# Patient Record
Sex: FEMALE | Race: WHITE | NOT HISPANIC OR LATINO | Employment: OTHER | URBAN - METROPOLITAN AREA
[De-identification: names, ages, dates, MRNs, and addresses within clinical notes are randomized per-mention and may not be internally consistent; named-entity substitution may affect disease eponyms.]

---

## 2019-02-24 ENCOUNTER — HOSPITAL ENCOUNTER (INPATIENT)
Facility: MEDICAL CENTER | Age: 34
LOS: 2 days | DRG: 156 | End: 2019-02-26
Attending: EMERGENCY MEDICINE | Admitting: INTERNAL MEDICINE
Payer: COMMERCIAL

## 2019-02-24 ENCOUNTER — APPOINTMENT (OUTPATIENT)
Dept: RADIOLOGY | Facility: MEDICAL CENTER | Age: 34
DRG: 156 | End: 2019-02-24
Attending: EMERGENCY MEDICINE
Payer: COMMERCIAL

## 2019-02-24 ENCOUNTER — HOSPITAL ENCOUNTER (OUTPATIENT)
Dept: RADIOLOGY | Facility: MEDICAL CENTER | Age: 34
End: 2019-02-24

## 2019-02-24 DIAGNOSIS — R06.1 STRIDOR: ICD-10-CM

## 2019-02-24 DIAGNOSIS — J06.9 UPPER RESPIRATORY TRACT INFECTION, UNSPECIFIED TYPE: ICD-10-CM

## 2019-02-24 DIAGNOSIS — R06.02 SOB (SHORTNESS OF BREATH): ICD-10-CM

## 2019-02-24 PROBLEM — E87.6 HYPOKALEMIA: Status: ACTIVE | Noted: 2019-02-24

## 2019-02-24 LAB
ALBUMIN SERPL BCP-MCNC: 4.4 G/DL (ref 3.2–4.9)
ALBUMIN/GLOB SERPL: 1.7 G/DL
ALP SERPL-CCNC: 65 U/L (ref 30–99)
ALT SERPL-CCNC: 13 U/L (ref 2–50)
ANION GAP SERPL CALC-SCNC: 6 MMOL/L (ref 0–11.9)
AST SERPL-CCNC: 15 U/L (ref 12–45)
BASOPHILS # BLD AUTO: 0 % (ref 0–1.8)
BASOPHILS # BLD: 0 K/UL (ref 0–0.12)
BILIRUB SERPL-MCNC: 0.3 MG/DL (ref 0.1–1.5)
BUN SERPL-MCNC: 15 MG/DL (ref 8–22)
CALCIUM SERPL-MCNC: 9.5 MG/DL (ref 8.5–10.5)
CHLORIDE SERPL-SCNC: 107 MMOL/L (ref 96–112)
CO2 SERPL-SCNC: 21 MMOL/L (ref 20–33)
CREAT SERPL-MCNC: 0.75 MG/DL (ref 0.5–1.4)
EOSINOPHIL # BLD AUTO: 0 K/UL (ref 0–0.51)
EOSINOPHIL NFR BLD: 0 % (ref 0–6.9)
ERYTHROCYTE [DISTWIDTH] IN BLOOD BY AUTOMATED COUNT: 44.8 FL (ref 35.9–50)
GLOBULIN SER CALC-MCNC: 2.6 G/DL (ref 1.9–3.5)
GLUCOSE SERPL-MCNC: 181 MG/DL (ref 65–99)
HCT VFR BLD AUTO: 40 % (ref 37–47)
HGB BLD-MCNC: 13.3 G/DL (ref 12–16)
IMM GRANULOCYTES # BLD AUTO: 0.02 K/UL (ref 0–0.11)
IMM GRANULOCYTES NFR BLD AUTO: 0.3 % (ref 0–0.9)
LACTATE BLD-SCNC: 2.3 MMOL/L (ref 0.5–2)
LYMPHOCYTES # BLD AUTO: 0.46 K/UL (ref 1–4.8)
LYMPHOCYTES NFR BLD: 5.9 % (ref 22–41)
MCH RBC QN AUTO: 30.1 PG (ref 27–33)
MCHC RBC AUTO-ENTMCNC: 33.3 G/DL (ref 33.6–35)
MCV RBC AUTO: 90.5 FL (ref 81.4–97.8)
MONOCYTES # BLD AUTO: 0.09 K/UL (ref 0–0.85)
MONOCYTES NFR BLD AUTO: 1.2 % (ref 0–13.4)
NEUTROPHILS # BLD AUTO: 7.2 K/UL (ref 2–7.15)
NEUTROPHILS NFR BLD: 92.6 % (ref 44–72)
NRBC # BLD AUTO: 0 K/UL
NRBC BLD-RTO: 0 /100 WBC
PLATELET # BLD AUTO: 265 K/UL (ref 164–446)
PMV BLD AUTO: 10.7 FL (ref 9–12.9)
POTASSIUM SERPL-SCNC: 3.4 MMOL/L (ref 3.6–5.5)
PROT SERPL-MCNC: 7 G/DL (ref 6–8.2)
RBC # BLD AUTO: 4.42 M/UL (ref 4.2–5.4)
SODIUM SERPL-SCNC: 134 MMOL/L (ref 135–145)
WBC # BLD AUTO: 7.8 K/UL (ref 4.8–10.8)

## 2019-02-24 PROCEDURE — 0CJS8ZZ INSPECTION OF LARYNX, VIA NATURAL OR ARTIFICIAL OPENING ENDOSCOPIC: ICD-10-PCS | Performed by: OTOLARYNGOLOGY

## 2019-02-24 PROCEDURE — 85025 COMPLETE CBC W/AUTO DIFF WBC: CPT

## 2019-02-24 PROCEDURE — 99291 CRITICAL CARE FIRST HOUR: CPT

## 2019-02-24 PROCEDURE — 700111 HCHG RX REV CODE 636 W/ 250 OVERRIDE (IP): Performed by: EMERGENCY MEDICINE

## 2019-02-24 PROCEDURE — 700102 HCHG RX REV CODE 250 W/ 637 OVERRIDE(OP)

## 2019-02-24 PROCEDURE — 83605 ASSAY OF LACTIC ACID: CPT

## 2019-02-24 PROCEDURE — 770022 HCHG ROOM/CARE - ICU (200)

## 2019-02-24 PROCEDURE — 80053 COMPREHEN METABOLIC PANEL: CPT

## 2019-02-24 PROCEDURE — 700111 HCHG RX REV CODE 636 W/ 250 OVERRIDE (IP): Performed by: INTERNAL MEDICINE

## 2019-02-24 PROCEDURE — 71045 X-RAY EXAM CHEST 1 VIEW: CPT

## 2019-02-24 PROCEDURE — 99291 CRITICAL CARE FIRST HOUR: CPT | Performed by: INTERNAL MEDICINE

## 2019-02-24 PROCEDURE — 94640 AIRWAY INHALATION TREATMENT: CPT

## 2019-02-24 PROCEDURE — 96365 THER/PROPH/DIAG IV INF INIT: CPT

## 2019-02-24 PROCEDURE — 700102 HCHG RX REV CODE 250 W/ 637 OVERRIDE(OP): Performed by: INTERNAL MEDICINE

## 2019-02-24 PROCEDURE — 94760 N-INVAS EAR/PLS OXIMETRY 1: CPT

## 2019-02-24 PROCEDURE — 700105 HCHG RX REV CODE 258: Performed by: EMERGENCY MEDICINE

## 2019-02-24 RX ORDER — BISACODYL 10 MG
10 SUPPOSITORY, RECTAL RECTAL
Status: DISCONTINUED | OUTPATIENT
Start: 2019-02-24 | End: 2019-02-26 | Stop reason: HOSPADM

## 2019-02-24 RX ORDER — PREDNISONE 10 MG/1
10 TABLET ORAL DAILY
Status: DISCONTINUED | OUTPATIENT
Start: 2019-03-11 | End: 2019-02-26 | Stop reason: HOSPADM

## 2019-02-24 RX ORDER — PREDNISONE 20 MG/1
20 TABLET ORAL DAILY
Status: DISCONTINUED | OUTPATIENT
Start: 2019-03-04 | End: 2019-02-24

## 2019-02-24 RX ORDER — BENZONATATE 100 MG/1
100 CAPSULE ORAL 3 TIMES DAILY PRN
COMMUNITY

## 2019-02-24 RX ORDER — AMOXICILLIN 250 MG
2 CAPSULE ORAL 2 TIMES DAILY
Status: DISCONTINUED | OUTPATIENT
Start: 2019-02-24 | End: 2019-02-26 | Stop reason: HOSPADM

## 2019-02-24 RX ORDER — PREDNISONE 10 MG/1
10 TABLET ORAL DAILY
Status: DISCONTINUED | OUTPATIENT
Start: 2019-03-11 | End: 2019-02-24

## 2019-02-24 RX ORDER — METOCLOPRAMIDE 10 MG/1
10 TABLET ORAL 4 TIMES DAILY
COMMUNITY
End: 2019-02-24

## 2019-02-24 RX ORDER — METOCLOPRAMIDE 10 MG/1
10 TABLET ORAL 4 TIMES DAILY
COMMUNITY

## 2019-02-24 RX ORDER — PREDNISONE 20 MG/1
40 TABLET ORAL DAILY
Status: DISCONTINUED | OUTPATIENT
Start: 2019-02-25 | End: 2019-02-24

## 2019-02-24 RX ORDER — PREDNISONE 20 MG/1
40 TABLET ORAL DAILY
Status: DISCONTINUED | OUTPATIENT
Start: 2019-02-25 | End: 2019-02-26 | Stop reason: HOSPADM

## 2019-02-24 RX ORDER — ENALAPRILAT 1.25 MG/ML
1.25 INJECTION INTRAVENOUS EVERY 6 HOURS PRN
Status: DISCONTINUED | OUTPATIENT
Start: 2019-02-24 | End: 2019-02-26 | Stop reason: HOSPADM

## 2019-02-24 RX ORDER — DEXAMETHASONE SODIUM PHOSPHATE 4 MG/ML
10 INJECTION, SOLUTION INTRA-ARTICULAR; INTRALESIONAL; INTRAMUSCULAR; INTRAVENOUS; SOFT TISSUE EVERY 6 HOURS
Status: COMPLETED | OUTPATIENT
Start: 2019-02-24 | End: 2019-02-25

## 2019-02-24 RX ORDER — GUAIFENESIN/DEXTROMETHORPHAN 100-10MG/5
10 SYRUP ORAL EVERY 6 HOURS PRN
Status: DISCONTINUED | OUTPATIENT
Start: 2019-02-24 | End: 2019-02-26 | Stop reason: HOSPADM

## 2019-02-24 RX ORDER — POLYETHYLENE GLYCOL 3350 17 G/17G
1 POWDER, FOR SOLUTION ORAL
Status: DISCONTINUED | OUTPATIENT
Start: 2019-02-24 | End: 2019-02-26 | Stop reason: HOSPADM

## 2019-02-24 RX ORDER — ACETAMINOPHEN 325 MG/1
650 TABLET ORAL EVERY 6 HOURS PRN
Status: DISCONTINUED | OUTPATIENT
Start: 2019-02-24 | End: 2019-02-26 | Stop reason: HOSPADM

## 2019-02-24 RX ORDER — PREDNISONE 20 MG/1
20 TABLET ORAL DAILY
Status: DISCONTINUED | OUTPATIENT
Start: 2019-03-04 | End: 2019-02-26 | Stop reason: HOSPADM

## 2019-02-24 RX ADMIN — CEFTRIAXONE SODIUM 2 G: 2 INJECTION, POWDER, FOR SOLUTION INTRAMUSCULAR; INTRAVENOUS at 16:43

## 2019-02-24 RX ADMIN — DEXAMETHASONE SODIUM PHOSPHATE 10 MG: 4 INJECTION, SOLUTION INTRAMUSCULAR; INTRAVENOUS at 20:03

## 2019-02-24 RX ADMIN — RACEPINEPHRINE HYDROCHLORIDE 0.5 ML: 11.25 SOLUTION RESPIRATORY (INHALATION) at 16:04

## 2019-02-24 RX ADMIN — GUAIFENESIN AND DEXTROMETHORPHAN 10 ML: 100; 10 SYRUP ORAL at 20:03

## 2019-02-24 ASSESSMENT — ENCOUNTER SYMPTOMS
PALPITATIONS: 0
DIAPHORESIS: 0
TREMORS: 0
PND: 0
WEAKNESS: 0
NECK PAIN: 0
HEMOPTYSIS: 0
CLAUDICATION: 0
WHEEZING: 1
SPEECH CHANGE: 0
CHILLS: 0
WEIGHT LOSS: 0
ABDOMINAL PAIN: 0
DOUBLE VISION: 0
FEVER: 0
VOMITING: 0
SEIZURES: 0
HEADACHES: 1
PHOTOPHOBIA: 0
BLURRED VISION: 0
MYALGIAS: 0
EYE PAIN: 0
COUGH: 1
HEADACHES: 0
MYALGIAS: 1
CONSTIPATION: 0
NAUSEA: 0
SHORTNESS OF BREATH: 1
FOCAL WEAKNESS: 0
BACK PAIN: 0
BRUISES/BLEEDS EASILY: 0
STRIDOR: 1
DIZZINESS: 0
SORE THROAT: 0
HALLUCINATIONS: 0

## 2019-02-24 ASSESSMENT — PATIENT HEALTH QUESTIONNAIRE - PHQ9
7. TROUBLE CONCENTRATING ON THINGS, SUCH AS READING THE NEWSPAPER OR WATCHING TELEVISION: NOT AT ALL
SUM OF ALL RESPONSES TO PHQ QUESTIONS 1-9: 2
2. FEELING DOWN, DEPRESSED, IRRITABLE, OR HOPELESS: SEVERAL DAYS
6. FEELING BAD ABOUT YOURSELF - OR THAT YOU ARE A FAILURE OR HAVE LET YOURSELF OR YOUR FAMILY DOWN: NOT AL ALL
1. LITTLE INTEREST OR PLEASURE IN DOING THINGS: SEVERAL DAYS
8. MOVING OR SPEAKING SO SLOWLY THAT OTHER PEOPLE COULD HAVE NOTICED. OR THE OPPOSITE, BEING SO FIGETY OR RESTLESS THAT YOU HAVE BEEN MOVING AROUND A LOT MORE THAN USUAL: NOT AT ALL
9. THOUGHTS THAT YOU WOULD BE BETTER OFF DEAD, OR OF HURTING YOURSELF: NOT AT ALL
5. POOR APPETITE OR OVEREATING: NOT AT ALL
3. TROUBLE FALLING OR STAYING ASLEEP OR SLEEPING TOO MUCH: NOT AT ALL
SUM OF ALL RESPONSES TO PHQ9 QUESTIONS 1 AND 2: 2
4. FEELING TIRED OR HAVING LITTLE ENERGY: NOT AT ALL

## 2019-02-24 ASSESSMENT — COGNITIVE AND FUNCTIONAL STATUS - GENERAL
MOBILITY SCORE: 23
SUGGESTED CMS G CODE MODIFIER DAILY ACTIVITY: CH
CLIMB 3 TO 5 STEPS WITH RAILING: A LITTLE
SUGGESTED CMS G CODE MODIFIER MOBILITY: CI
DAILY ACTIVITIY SCORE: 24

## 2019-02-24 ASSESSMENT — COPD QUESTIONNAIRES
DURING THE PAST 4 WEEKS HOW MUCH DID YOU FEEL SHORT OF BREATH: NONE/LITTLE OF THE TIME
COPD SCREENING SCORE: 0
HAVE YOU SMOKED AT LEAST 100 CIGARETTES IN YOUR ENTIRE LIFE: NO/DON'T KNOW
DO YOU EVER COUGH UP ANY MUCUS OR PHLEGM?: NO/ONLY WITH OCCASIONAL COLDS OR INFECTIONS

## 2019-02-24 ASSESSMENT — LIFESTYLE VARIABLES
SUBSTANCE_ABUSE: 0
DO YOU DRINK ALCOHOL: NO
EVER_SMOKED: NEVER
EVER_SMOKED: NEVER

## 2019-02-24 NOTE — LETTER
February 26, 2019    To Whom It May Concern:         This is confirmation that Chantale Donato was admitted to [facility] from 2/24/2019-2/26/2019. Her medical conditions prohibit her from resuming her work until 5/1/2019.         If you have any questions please do not hesitate to call me at the phone number listed below.    Sincerely,          John Jefferson

## 2019-02-25 PROBLEM — J38.01 UNILATERAL VOCAL CORD PARALYSIS: Status: ACTIVE | Noted: 2019-02-25

## 2019-02-25 LAB
ANION GAP SERPL CALC-SCNC: 5 MMOL/L (ref 0–11.9)
BASOPHILS # BLD AUTO: 0.1 % (ref 0–1.8)
BASOPHILS # BLD: 0.01 K/UL (ref 0–0.12)
BUN SERPL-MCNC: 17 MG/DL (ref 8–22)
CALCIUM SERPL-MCNC: 9.5 MG/DL (ref 8.5–10.5)
CHLORIDE SERPL-SCNC: 110 MMOL/L (ref 96–112)
CO2 SERPL-SCNC: 21 MMOL/L (ref 20–33)
CREAT SERPL-MCNC: 0.56 MG/DL (ref 0.5–1.4)
EOSINOPHIL # BLD AUTO: 0.01 K/UL (ref 0–0.51)
EOSINOPHIL NFR BLD: 0.1 % (ref 0–6.9)
ERYTHROCYTE [DISTWIDTH] IN BLOOD BY AUTOMATED COUNT: 45.9 FL (ref 35.9–50)
GLUCOSE SERPL-MCNC: 132 MG/DL (ref 65–99)
HCT VFR BLD AUTO: 38.9 % (ref 37–47)
HGB BLD-MCNC: 13 G/DL (ref 12–16)
IMM GRANULOCYTES # BLD AUTO: 0.05 K/UL (ref 0–0.11)
IMM GRANULOCYTES NFR BLD AUTO: 0.5 % (ref 0–0.9)
LYMPHOCYTES # BLD AUTO: 0.72 K/UL (ref 1–4.8)
LYMPHOCYTES NFR BLD: 7.1 % (ref 22–41)
MCH RBC QN AUTO: 30 PG (ref 27–33)
MCHC RBC AUTO-ENTMCNC: 33.4 G/DL (ref 33.6–35)
MCV RBC AUTO: 89.8 FL (ref 81.4–97.8)
MONOCYTES # BLD AUTO: 0.18 K/UL (ref 0–0.85)
MONOCYTES NFR BLD AUTO: 1.8 % (ref 0–13.4)
NEUTROPHILS # BLD AUTO: 9.15 K/UL (ref 2–7.15)
NEUTROPHILS NFR BLD: 90.4 % (ref 44–72)
NRBC # BLD AUTO: 0 K/UL
NRBC BLD-RTO: 0 /100 WBC
PLATELET # BLD AUTO: 247 K/UL (ref 164–446)
PMV BLD AUTO: 10.8 FL (ref 9–12.9)
POTASSIUM SERPL-SCNC: 3.9 MMOL/L (ref 3.6–5.5)
RBC # BLD AUTO: 4.33 M/UL (ref 4.2–5.4)
SODIUM SERPL-SCNC: 136 MMOL/L (ref 135–145)
WBC # BLD AUTO: 10.1 K/UL (ref 4.8–10.8)

## 2019-02-25 PROCEDURE — 99233 SBSQ HOSP IP/OBS HIGH 50: CPT | Performed by: INTERNAL MEDICINE

## 2019-02-25 PROCEDURE — 700102 HCHG RX REV CODE 250 W/ 637 OVERRIDE(OP): Performed by: INTERNAL MEDICINE

## 2019-02-25 PROCEDURE — 770001 HCHG ROOM/CARE - MED/SURG/GYN PRIV*

## 2019-02-25 PROCEDURE — 80048 BASIC METABOLIC PNL TOTAL CA: CPT

## 2019-02-25 PROCEDURE — A9270 NON-COVERED ITEM OR SERVICE: HCPCS | Performed by: INTERNAL MEDICINE

## 2019-02-25 PROCEDURE — 700111 HCHG RX REV CODE 636 W/ 250 OVERRIDE (IP): Performed by: INTERNAL MEDICINE

## 2019-02-25 PROCEDURE — 85025 COMPLETE CBC W/AUTO DIFF WBC: CPT

## 2019-02-25 RX ORDER — FAMOTIDINE 20 MG/1
20 TABLET, FILM COATED ORAL 2 TIMES DAILY
Status: DISCONTINUED | OUTPATIENT
Start: 2019-02-25 | End: 2019-02-26 | Stop reason: HOSPADM

## 2019-02-25 RX ADMIN — DEXAMETHASONE SODIUM PHOSPHATE 10 MG: 4 INJECTION, SOLUTION INTRAMUSCULAR; INTRAVENOUS at 05:32

## 2019-02-25 RX ADMIN — PREDNISONE 40 MG: 20 TABLET ORAL at 12:03

## 2019-02-25 RX ADMIN — FAMOTIDINE 20 MG: 20 TABLET ORAL at 17:39

## 2019-02-25 RX ADMIN — GUAIFENESIN AND DEXTROMETHORPHAN 10 ML: 100; 10 SYRUP ORAL at 05:32

## 2019-02-25 RX ADMIN — ENOXAPARIN SODIUM 40 MG: 100 INJECTION SUBCUTANEOUS at 05:32

## 2019-02-25 RX ADMIN — FAMOTIDINE 20 MG: 20 TABLET ORAL at 08:11

## 2019-02-25 RX ADMIN — DEXAMETHASONE SODIUM PHOSPHATE 10 MG: 4 INJECTION, SOLUTION INTRAMUSCULAR; INTRAVENOUS at 00:46

## 2019-02-25 RX ADMIN — GUAIFENESIN AND DEXTROMETHORPHAN 10 ML: 100; 10 SYRUP ORAL at 17:42

## 2019-02-25 ASSESSMENT — ENCOUNTER SYMPTOMS
EYE REDNESS: 0
PALPITATIONS: 0
NAUSEA: 0
SINUS PAIN: 0
ABDOMINAL PAIN: 0
HEADACHES: 1
HEARTBURN: 0
FEVER: 0
SORE THROAT: 1
EYE DISCHARGE: 0
CHILLS: 0
SHORTNESS OF BREATH: 1
PSYCHIATRIC NEGATIVE: 1
WHEEZING: 1
VOMITING: 0
STRIDOR: 1
MYALGIAS: 1
COUGH: 1
DIARRHEA: 0

## 2019-02-25 ASSESSMENT — COPD QUESTIONNAIRES
DURING THE PAST 4 WEEKS HOW MUCH DID YOU FEEL SHORT OF BREATH: NONE/LITTLE OF THE TIME
HAVE YOU SMOKED AT LEAST 100 CIGARETTES IN YOUR ENTIRE LIFE: NO/DON'T KNOW
DO YOU EVER COUGH UP ANY MUCUS OR PHLEGM?: NO/ONLY WITH OCCASIONAL COLDS OR INFECTIONS
COPD SCREENING SCORE: 0

## 2019-02-25 ASSESSMENT — LIFESTYLE VARIABLES: EVER_SMOKED: NEVER

## 2019-02-25 NOTE — CONSULTS
PULMONARY AND CRITICAL CARE MEDICINE CONSULTATION    Date of Consultation:  2/24/2019    Requesting Physician:  Segundo Pal MD    Consulting Physician:  Orion Penaloza MD    Reason for Consultation: Critical care management in lady with stridor    Chief Complaint: Dyspnea and hoarseness    History of Present Illness:    I was kindly asked to see and evaluate Chantale Donato, a 33 y.o. female for evaluation and management of the above problem.    This is a previously healthy lady.  She presented to an outside hospital earlier today with a complaint of shortness of breath and wheezing.  She had stridor.  She was treated with steroids and nebulized epinephrine.  She continued to have significant dyspnea and was transferred to [facility] for subspecialty care.  Dr. Laura Ireland from ENT will be seeing her shortly.  This lady tells me that her symptoms began approximately 1 month ago.  At that time she had flulike symptoms and presented for evaluation.  She was diagnosed with influenza.  This diagnosis was made with a flu swab.  She was treated with Tamiflu and a Z-Ruiz.  Her fever, myalgias and arthralgias have subsequently resolved.  She developed hoarseness and problems with breathing with wheezing.  About a week after she was seen for influenza, she presented to her PCP and was given steroids and a second Z-Ruiz.  Despite these treatments, she does not feel like she has been improving.  She continues to have a feeling that her throat is constricted with associated hoarseness, wheezing and dyspnea.  She denies any fevers, chills or sweats.  She is not producing sputum.    Medications Prior to Admission:    No current facility-administered medications on file prior to encounter.      No current outpatient prescriptions on file prior to encounter.       Current Medications:      Current Facility-Administered Medications:   •  cefTRIAXone (ROCEPHIN) 2 g in  mL IVPB, 2 g, Intravenous, Once, Segundo SIBLEY  SHERWIN Pal, Last Rate: 200 mL/hr at 02/24/19 1643, 2 g at 02/24/19 1643  •  Respiratory Care per Protocol, , Nebulization, Continuous RT, Orion Penaloza M.D.  •  dexamethasone (DECADRON) injection 10 mg, 10 mg, Intravenous, Q6HRS, Orion Penaloza M.D.    Current Outpatient Prescriptions:   •  metoclopramide (REGLAN) 10 MG Tab, Take 10 mg by mouth 4 times a day., Disp: , Rfl:   •  benzonatate (TESSALON) 100 MG Cap, Take 100 mg by mouth 3 times a day as needed for Cough., Disp: , Rfl:     Allergies:    Cinnamon    Past Surgical History:    No past surgical history on file.    Past Medical History:    Past Medical History:   Diagnosis Date   • Epiglottitis        Social History:    Social History     Social History   • Marital status:      Spouse name: N/A   • Number of children: N/A   • Years of education: N/A     Occupational History   • Not on file.     Social History Main Topics   • Smoking status: Not on file   • Smokeless tobacco: Not on file   • Alcohol use Not on file   • Drug use: Unknown   • Sexual activity: Not on file     Other Topics Concern   • Not on file     Social History Narrative   • No narrative on file       Family History:    No family history on file.    Review of System:    Review of Systems   Constitutional: Negative for chills, diaphoresis and fever.   HENT: Positive for congestion. Negative for nosebleeds.    Eyes: Negative for blurred vision, double vision and photophobia.   Respiratory: Positive for cough, shortness of breath, wheezing and stridor. Negative for hemoptysis.    Cardiovascular: Negative for chest pain, claudication and leg swelling.   Gastrointestinal: Negative for abdominal pain, constipation, nausea and vomiting.   Genitourinary: Negative for dysuria, hematuria and urgency.   Musculoskeletal: Negative for back pain and myalgias.   Skin: Negative for rash.   Neurological: Negative for speech change, focal weakness, seizures and headaches.  "  Endo/Heme/Allergies: Does not bruise/bleed easily.   Psychiatric/Behavioral: Negative for hallucinations, substance abuse and suicidal ideas.       Physical Examination:    /87   Pulse 95   Temp 36.9 °C (98.4 °F) (Temporal)   Resp 14   Ht 1.753 m (5' 9\")   Wt 86.2 kg (190 lb)   SpO2 100%   BMI 28.06 kg/m²   Physical Exam   Constitutional: She is oriented to person, place, and time. She appears distressed.   HENT:   Head: Normocephalic and atraumatic.   Right Ear: External ear normal.   Left Ear: External ear normal.   Nose: Nose normal.   Mouth/Throat: Oropharynx is clear and moist.   Eyes: Pupils are equal, round, and reactive to light. Conjunctivae are normal. Right eye exhibits no discharge. Left eye exhibits no discharge.   Neck: Normal range of motion. Neck supple. No JVD present. No tracheal deviation present.   Cardiovascular: Intact distal pulses.  Exam reveals no gallop and no friction rub.    No murmur heard.  Sinus tachycardia   Pulmonary/Chest: Stridor present. She has no rales.   She has stridor   Abdominal: Soft. Bowel sounds are normal. She exhibits no distension. There is no tenderness. There is no rebound.   Musculoskeletal: She exhibits no tenderness or deformity.   No clubbing or cyanosis   Neurological: She is alert and oriented to person, place, and time. No cranial nerve deficit. Coordination normal. GCS score is 15.   No focal weakness   Skin: Skin is warm and dry. No rash noted. She is not diaphoretic. No erythema.       Laboratory Data:                              Imaging:    I personally viewed the CXR and CT scan images as well as reviewed the radiology interpretation reports.    DX-CHEST-PORTABLE (1 VIEW)   Final Result      No acute cardiac or pulmonary abnormalities are identified.      CT-FOREIGN FILM CAT SCAN   Final Result      OUTSIDE IMAGES-DX CHEST   Final Result          Assessment and Plan:    Stridor   Assessment & Plan    Start Decadron, 10 mg IV every 6 " hours  Nebulized racemic epinephrine as necessary  She may require intubation and mechanical ventilatory support  ENT evaluation for visual evaluation of her airway       I have assessed and reassessed her respiratory status.  She is critically ill and I am admitting her to the ICU for very close observation.  She is at high risk of developing worsening respiratory system dysfunction.    High risk of deterioration and worsening vital organ dysfunction and death without the above critical care interventions.    Thank you for allowing me to participate in the care of this lady.  I will continue to follow her with great interest.    Critical Care Time: 35 minutes  62[address]  No time overlap  Time excludes procedures  Discussed with RN, RT, ER physician, Dr. Ireland, [employer] resident    Orion Penaloza MD  Pulmonary and Critical Care Medicine

## 2019-02-25 NOTE — CONSULTS
DATE OF SERVICE:  02/24/2019    REFERRING PHYSICIAN:  Segundo Pal MD, Otolaryngology.    REASON FOR CONSULTATION:  Question of upper airway obstruction.    REVIEW OF MEDICAL RECORD:  The patient is a 33-year-old female who comes in   from [place].  The patient initially was transferred from [place].  She reported   cold, flu-like symptoms over the last 4 weeks, initially diagnosed as having   influenza and placed on 5 days of Tamiflu followed by a Z-CARMENCITA.  The patient   then started having some difficulty again a couple of weeks afterwards where   she was put on a second Z-CARMENCITA.  She presented today with stridor.  Patient had   had a CT of neck and a chest x-ray completed at the outside hospital.  On   admission here, the patient had received albuterol, racemic epinephrine and   Decadron.  She also had a prior history of epiglottitis 5 years ago.    In talking with the patient, she has difficulty with catching her breath and   expiration becoming a problem.  She is having some issues with her speech and   currently sitting up in bed and conversing with me.  During that period of   time when the patient is conversant, symptoms abated to baseline normal   breathing.  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    ALLERGIES:  TO CINNAMON.    MEDICATIONS:  Not listed.    PAST MEDICAL HISTORY:  Prior history of epiglottitis 5 years ago.    PAST SURGICAL HISTORY:  No discuss surgical history.    PHYSICAL EXAMINATION:  GENERAL:  This is a well-nourished, well-appearing 33-year-old female,   currently sitting in bed.  She is able to converse with me.  She is able   turned sideways at the bed without difficulty.  She currently is on room air.    Doing a rigid endoscopy with the 70-degree scope, we were able to have the    patient looked over into the larynx.  I can see into the immediate subglottis,   left vocal cord is moving completely, right vocal cord has trailing motion   and slight weakness.  Upon inspiration, the patient is able to hold the   glottis shut and then in observing this, after she relaxes, she is able to   breathe normally and the vocal cord is able to move out of the way.  At no   time did the patient have laryngospasm during the examination.  Posterior   arytenoids were clear.  Epiglottis was normal.  There is no evidence of any   supraglottic swelling, erythema and edema.  No evidence of any pus and the   false vocal cords are within normal limits as well.  Trachea is midline.  No   thyromegaly is appreciated.    LABORATORY DATA:  Laboratory data was performed and shows white count of 7.8,   a left shift of 92.6 on neutrophils, platelets are 265.  Laboratory data shows   sodium of 134, potassium 3.4, chloride of 107, and glucose of 181.  CT scan   outside was reviewed, normal epiglottis.  No evidence of any upper airway   obstruction seen.    IMPRESSION:  Slight right-sided weakness of the true vocal cords, most likely   secondary to URI.    PLAN:  I discussed with the patient that at this point there is possibility   that a higher dose of steroids may be helpful; however, she did have problems   with steroids in the past with irritability and mood disorder.  I did discuss   with her about vocal rest, no signing or lecturing, and certainly no   whispering either.  The patient herself does have some significant cough   difficulties, so we discussed about using different antitussive medications   including Tussionex or other controlled substance medications in the past, she   has used Tessalon and there are ineffective.  We will go ahead and review   this with the admitting service.       ____________________________________     MD ALAYNA PEDROZA / SHANTELL    DD:  02/24/2019 17:24:38  DT:  02/24/2019  18:51:36    D#:  [id number]  Job#:  [id number]

## 2019-02-25 NOTE — ED PROVIDER NOTES
ED Provider Note    CHIEF COMPLAINT  Chief Complaint   Patient presents with   • Shortness of Breath       HPI  Chantale Donato is a 33 y.o. female who presents to the emergency department for difficulty breathing.  She was recently diagnosed with the flu.  She was put on Z-Ruiz, Tamiflu and steroids.  She never really got better.  She was getting worse today so she went to the emergency department where she was worked up with a chest x-ray which was unremarkable CT of the neck which was unremarkable given multiple breathing treatments and steroids.  Despite that she continues to feel like she has a hard time breathing.  She has no chest pain.  No fevers or chills.  She feels there is a ball in her neck obstructing her exhalation.  He has a previous history of epiglottitis.  Denies any other aggravating leaving factors or associated complaints    REVIEW OF SYSTEMS  See HPI for further details. All other systems are negative.    PAST MEDICAL HISTORY  Past Medical History:   Diagnosis Date   • Epiglottitis        FAMILY HISTORY  No family history on file.    SOCIAL HISTORY  Social History     Social History   • Marital status:      Spouse name: N/A   • Number of children: N/A   • Years of education: N/A     Social History Main Topics   • Smoking status: Not on file   • Smokeless tobacco: Not on file   • Alcohol use Not on file   • Drug use: Unknown   • Sexual activity: Not on file     Other Topics Concern   • Not on file     Social History Narrative   • No narrative on file       SURGICAL HISTORY  No past surgical history on file.    CURRENT MEDICATIONS  Home Medications     Reviewed by Anselmo Askew (Pharmacy Tech) on 02/24/19 at 1630  Med List Status: Complete   Medication Last Dose Status   benzonatate (TESSALON) 100 MG Cap 2/24/2019 Active   metoclopramide (REGLAN) 10 MG Tab 2/24/2019 Active                ALLERGIES  Allergies   Allergen Reactions   • Cinnamon      Blisters on lips         PHYSICAL  "EXAM  VITAL SIGNS: /87   Pulse 95   Resp 14   Ht 1.753 m (5' 9\")   Wt 86.2 kg (190 lb)   SpO2 100%   BMI 28.06 kg/m²    Constitutional: Awake alert sitting for anxious in moderate distress   HENT: Normocephalic, Atraumatic, Bilateral external ears normal, Oropharynx moist, No oral exudates, Nose normal.   Eyes: PERRL, EOMI, Conjunctiva normal, No discharge.   Neck: Normal range of motion, No tenderness, Supple, some abnormal lung sounds may be stridor F.  To be very pronounced on expiration but not present on inspiration  Cardiovascular: Normal heart rate, Normal rhythm, No murmurs, No rubs, No gallops.   Thorax & Lungs: Normal breath sounds, No respiratory distress, No wheezing,  Abdomen: Bowel sounds normal, Soft, No tenderness,  Skin: Warm, Dry, No erythema, No rash.   Back: No tenderness, No CVA tenderness.      Musculoskeletal: Good range of motion in all major joints.   Neurologic: Alert,  No focal deficits noted.   Psychiatric: Affect normal    Results for orders placed or performed during the hospital encounter of 02/24/19   CBC WITH DIFFERENTIAL   Result Value Ref Range    WBC 7.8 4.8 - 10.8 K/uL    RBC 4.42 4.20 - 5.40 M/uL    Hemoglobin 13.3 12.0 - 16.0 g/dL    Hematocrit 40.0 37.0 - 47.0 %    MCV 90.5 81.4 - 97.8 fL    MCH 30.1 27.0 - 33.0 pg    MCHC 33.3 (L) 33.6 - 35.0 g/dL    RDW 44.8 35.9 - 50.0 fL    Platelet Count 265 164 - 446 K/uL    MPV 10.7 9.0 - 12.9 fL    Neutrophils-Polys 92.60 (H) 44.00 - 72.00 %    Lymphocytes 5.90 (L) 22.00 - 41.00 %    Monocytes 1.20 0.00 - 13.40 %    Eosinophils 0.00 0.00 - 6.90 %    Basophils 0.00 0.00 - 1.80 %    Immature Granulocytes 0.30 0.00 - 0.90 %    Nucleated RBC 0.00 /100 WBC    Neutrophils (Absolute) 7.20 (H) 2.00 - 7.15 K/uL    Lymphs (Absolute) 0.46 (L) 1.00 - 4.80 K/uL    Monos (Absolute) 0.09 0.00 - 0.85 K/uL    Eos (Absolute) 0.00 0.00 - 0.51 K/uL    Baso (Absolute) 0.00 0.00 - 0.12 K/uL    Immature Granulocytes (abs) 0.02 0.00 - 0.11 K/uL    " NRBC (Absolute) 0.00 K/uL   COMP METABOLIC PANEL   Result Value Ref Range    Sodium 134 (L) 135 - 145 mmol/L    Potassium 3.4 (L) 3.6 - 5.5 mmol/L    Chloride 107 96 - 112 mmol/L    Co2 21 20 - 33 mmol/L    Anion Gap 6.0 0.0 - 11.9    Glucose 181 (H) 65 - 99 mg/dL    Bun 15 8 - 22 mg/dL    Creatinine 0.75 0.50 - 1.40 mg/dL    Calcium 9.5 8.5 - 10.5 mg/dL    AST(SGOT) 15 12 - 45 U/L    ALT(SGPT) 13 2 - 50 U/L    Alkaline Phosphatase 65 30 - 99 U/L    Total Bilirubin 0.3 0.1 - 1.5 mg/dL    Albumin 4.4 3.2 - 4.9 g/dL    Total Protein 7.0 6.0 - 8.2 g/dL    Globulin 2.6 1.9 - 3.5 g/dL    A-G Ratio 1.7 g/dL   LACTIC ACID   Result Value Ref Range    Lactic Acid 2.3 (H) 0.5 - 2.0 mmol/L   ESTIMATED GFR   Result Value Ref Range    GFR If African American >60 >60 mL/min/1.73 m 2    GFR If Non African American >60 >60 mL/min/1.73 m 2        RADIOLOGY/PROCEDURES  DX-CHEST-PORTABLE (1 VIEW)   Final Result      No acute cardiac or pulmonary abnormalities are identified.      CT-FOREIGN FILM CAT SCAN   Final Result      OUTSIDE IMAGES-DX CHEST   Final Result            COURSE & MEDICAL DECISION MAKING  Pertinent Labs & Imaging studies reviewed. (See chart for details)        The patient presents transfer for difficulty breathing after several days of upper airway infection.  She was worked up with an x-ray and a CAT scan at the transferring hospital.  These were both read as normal.    I have independently reviewed the chest x-ray which does show clear lungs.  The neck CT looks grossly normal to me however the sequence of films is a bit unusual which conceivably could make it easy to miss an abnormal finding.    I spoke with Dr. Barron on call for ICU he came and saw the patient.  Additionally, I called ENT Dr. Ireland who came and performed a scope on the patient.    Dr. Ireland felt there was a little bit of vocal cord weakness on the right.  Did not see any other significant infection.  Do not recommend intubation.  Thought  some of this was made worse by her own actions.  He recommends admission and observation per      Labs were obtained and the results of been reviewed.  The patient currently does not have influenza.  She was empirically given Rocephin when I first saw her before we knew this was not an infectious process.  The patient will be admitted to the ICU for airway monitoring.  She is admitted in guarded condition.        FINAL IMPRESSION  1. SOB (shortness of breath)    2. Stridor    3. Upper respiratory tract infection, unspecified type    4.  Critical care time of 45 minutes.    2.   3.         Electronically signed by: Segundo Pal, 2/24/2019 4:27 PM

## 2019-02-25 NOTE — OP REPORT
DATE OF SERVICE:  02/24/2019    PREOPERATIVE DIAGNOSIS:  Question of upper airway obstruction.    POSTPROCEDURE DIAGNOSIS:  Slight right-sided vocal cord weakness exacerbated   by patient breathing on inspiration.    PROCEDURE:  A 70-degree Ji ever laryngoscopy exam.    SURGEON:  Laura Ireland MD    NARRATIVE:  I met the patient in a red 9.  She had had a history of upper   airway obstruction that they would like evaluated.  So at the bedside, we did   a 70-degree Ji ever evaluation.  While gently holding her tongue, we were   able to look over the top and into the oral cavity, oropharynx, and down into   the larynx, epiglottis was normal, aryepiglottic folds were normal.  Posterior   arytenoids were normal.  False cords are normal and the true vocal cords   showed no evidence of any erythema or infection, but the right-sided cord was   slightly weakened and on vigorous inspiration partially collapsing the airway   causing the obstructive symptoms.  When the patient was in a ____ manner, the   right vocal cord was able to release and move out of the way.  The subglottis   was immediately visualized.  No evidence of any obstruction or lesions.    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  See my note for recommendations.       ____________________________________     MD ALAYNA PEDROZA / NTS    DD:  02/24/2019 17:26:30  DT:  02/24/2019 17:53:13    D#:  [id number]  Job#:  [id number]

## 2019-02-25 NOTE — CARE PLAN
Problem: Communication  Goal: The ability to communicate needs accurately and effectively will improve    Intervention: [place] patient and significant other/support system to call light to alert staff of needs  Oriented patient to unit.

## 2019-02-25 NOTE — DISCHARGE PLANNING
Care Transition Team Assessment    Met with pt at bedside. According to pt she lives with her spouse and 5 month old baby.    Pt reports she is independent at baseline and does not use assistive devices. Pt said her 5 month old baby is being cared for by father.    No obvious needs identified at this time. Pt confirmed her  will provide transport at discharge.    Information Source  Orientation : Oriented x 4  Information Given By: Patient    Readmission Evaluation  Is this a readmission?: No    Interdisciplinary Discharge Planning  Does Admitting Nurse Feel This Could be a Complex Discharge?: No  Primary Care Physician: Adeola Mejia  Lives with - Patient's Self Care Capacity: Spouse, Child Less than 18 Years of Age (5 month old baby)  Support Systems: Spouse / Significant Other  Do You Take your Prescribed Medications Regularly: Yes  Able to Return to Previous ADL's: Yes  Mobility Issues: No  Patient Expects to be Discharged to:: Home  Assistance Needed: No  Durable Medical Equipment: Not Applicable    Discharge Preparedness  What is your plan after discharge?:  (Anticipate discharge to prior living)  What are your discharge supports?: Spouse  Prior Functional Level: Ambulatory, Independent with Activities of Daily Living, Independent with Medication Management  Difficulity with ADLs: None  Difficulity with IADLs: None    Functional Assesment  Prior Functional Level: Ambulatory, Independent with Activities of Daily Living, Independent with Medication Management    Finances  Prescription Coverage: Yes    Discharge Risks or Barriers  Discharge risks or barriers?: No    Anticipated Discharge Information  Anticipated discharge disposition: Home  Discharge Address: [address] [place] [address] Emi Hunt  Discharge Contact Phone Number: [phone]

## 2019-02-25 NOTE — H&P
Internal Medicine Admitting History and Physical    Note Author: Sonia Shelley M.D.       Name Chantale Donato 1985   Age/Sex 33 y.o. female   MRN [id number]   Code Status FULL     After 5PM or if no immediate response to page, please call for cross-coverage  Attending/Team: Dr Fleming See Patient List for primary contact information  Call [phone] to page    Resident  - Dr Shelley        Chief Complaint:   Shortness of breath, stridor     HPI:  33-year-old female with no significant past medical history who presented with shortness of breath and stridor.  Patient states 3 weeks ago she had a flu with symptoms of chills. fever and a cough.She tested influenza positive after she went to an ER.  She was given Tamiflu and Z-Ruiz.  She stated the symptoms resolved but she still had voice hoarseness.  About a week and half ago she went to her PCP because of persistent hoarseness.  She was given Z-Ruiz, medro dosepack, Tessalon Perles and over-the-counter cough suppressants which she states did not give relief.  She continued to have worsening hoarseness of her voice with shortness of breath shortness of breath and wheezing.  She denied any fever, chills, sore throat, chest pain, palpitations.    In the ED, she was afebrile, blood pressure was stable, she was bit tachycardic, ,RR 20.  Labs were significant for a lactate of 2.3, potassium 3.4,.  Chest x-ray did not show any acute pulmonary abnormality.  She received 1 dose of ceftriaxone 2 g and racemic Epi.  ENT was consulted in the ED, who noted that patient had right vocal cord weakness and recommended a 3-week tapering dose of oral steroids.  Patient is being admitted to the ICU for close monitoring of upper airway.      Review of Systems   Constitutional: Negative for chills, diaphoresis, fever and weight loss.   HENT: Negative for congestion, hearing loss, nosebleeds and sore throat.    Eyes: Negative for blurred vision, double  vision and pain.   Respiratory: Positive for cough, shortness of breath and wheezing.         Stridor    Cardiovascular: Negative for chest pain, palpitations, leg swelling and PND.   Gastrointestinal: Negative for abdominal pain, constipation, nausea and vomiting.   Genitourinary: Negative for dysuria, frequency and hematuria.   Musculoskeletal: Positive for myalgias. Negative for back pain, joint pain and neck pain.   Neurological: Positive for headaches. Negative for dizziness, tremors, focal weakness, seizures and weakness.             Past Medical History (Chronic medical problem, known complications and current treatment)     None.     Past Surgical History:  No past surgical history on file.     Uterine polyp removal.    Current Outpatient Medications:  Home Medications     Reviewed by Anselmo Askew (Pharmacy Tech) on 02/24/19 at 1630  Med List Status: Complete   Medication Last Dose Status   benzonatate (TESSALON) 100 MG Cap 2/24/2019 Active   metoclopramide (REGLAN) 10 MG Tab 2/24/2019 Active                Medication Allergy/Sensitivities:  Allergies   Allergen Reactions   • Cinnamon      Blisters on lips           Family History (mandatory)   No family history on file.    Social History (mandatory)   Social History     Social History   • Marital status:      Spouse name: N/A   • Number of children: N/A   • Years of education: N/A     Occupational History   • Not on file.     Social History Main Topics   • Smoking status: Not on file   • Smokeless tobacco: Not on file   • Alcohol use Not on file   • Drug use: Unknown   • Sexual activity: Not on file     Other Topics Concern   • Not on file     Social History Narrative   • No narrative on file     Living situation: Lives with  and baby.  PCP : Adeola Mejia D.O.    Physical Exam     Vitals:    02/24/19 1730 02/24/19 1735 02/24/19 1800 02/24/19 1830   BP:       Pulse: (!) 102 90 94 96   Resp: 16 16 16 16   Temp:       TempSrc:      "  SpO2: 97% 96% 96% 96%   Weight:       Height:         Body mass index is 28.06 kg/m².  /92   Pulse 96   Temp 36.9 °C (98.4 °F) (Temporal)   Resp 16   Ht 1.753 m (5' 9\")   Wt 86.2 kg (190 lb)   SpO2 96%   BMI 28.06 kg/m²   O2 therapy: Pulse Oximetry: 96 %, O2 (LPM): 2, O2 Delivery: None (Room Air)    Physical Exam   Constitutional: She is oriented to person, place, and time and well-developed, well-nourished, and in no distress.   HENT:   Head: Normocephalic and atraumatic.   Eyes: Pupils are equal, round, and reactive to light. Conjunctivae and EOM are normal.   Neck: Normal range of motion. Neck supple. No thyromegaly present.   Cardiovascular: Normal rate, regular rhythm, normal heart sounds and intact distal pulses.  Exam reveals no gallop and no friction rub.    No murmur heard.  Pulmonary/Chest: Effort normal. No respiratory distress. She has no wheezes. She has no rales.   Expiratory stridor heard   Abdominal: Soft. Bowel sounds are normal. She exhibits no distension. There is no tenderness. There is no rebound.   Musculoskeletal: Normal range of motion. She exhibits no edema or tenderness.   Neurological: She is alert and oriented to person, place, and time. GCS score is 15.   Skin: Skin is warm. No erythema.         Data Review       Old Records Request:   Deferred  Current Records review/summary: Deferred    Lab Data Review:  Recent Results (from the past 24 hour(s))   CBC WITH DIFFERENTIAL    Collection Time: 02/24/19  4:40 PM   Result Value Ref Range    WBC 7.8 4.8 - 10.8 K/uL    RBC 4.42 4.20 - 5.40 M/uL    Hemoglobin 13.3 12.0 - 16.0 g/dL    Hematocrit 40.0 37.0 - 47.0 %    MCV 90.5 81.4 - 97.8 fL    MCH 30.1 27.0 - 33.0 pg    MCHC 33.3 (L) 33.6 - 35.0 g/dL    RDW 44.8 35.9 - 50.0 fL    Platelet Count 265 164 - 446 K/uL    MPV 10.7 9.0 - 12.9 fL    Neutrophils-Polys 92.60 (H) 44.00 - 72.00 %    Lymphocytes 5.90 (L) 22.00 - 41.00 %    Monocytes 1.20 0.00 - 13.40 %    Eosinophils 0.00 0.00 " - 6.90 %    Basophils 0.00 0.00 - 1.80 %    Immature Granulocytes 0.30 0.00 - 0.90 %    Nucleated RBC 0.00 /100 WBC    Neutrophils (Absolute) 7.20 (H) 2.00 - 7.15 K/uL    Lymphs (Absolute) 0.46 (L) 1.00 - 4.80 K/uL    Monos (Absolute) 0.09 0.00 - 0.85 K/uL    Eos (Absolute) 0.00 0.00 - 0.51 K/uL    Baso (Absolute) 0.00 0.00 - 0.12 K/uL    Immature Granulocytes (abs) 0.02 0.00 - 0.11 K/uL    NRBC (Absolute) 0.00 K/uL   COMP METABOLIC PANEL    Collection Time: 02/24/19  4:40 PM   Result Value Ref Range    Sodium 134 (L) 135 - 145 mmol/L    Potassium 3.4 (L) 3.6 - 5.5 mmol/L    Chloride 107 96 - 112 mmol/L    Co2 21 20 - 33 mmol/L    Anion Gap 6.0 0.0 - 11.9    Glucose 181 (H) 65 - 99 mg/dL    Bun 15 8 - 22 mg/dL    Creatinine 0.75 0.50 - 1.40 mg/dL    Calcium 9.5 8.5 - 10.5 mg/dL    AST(SGOT) 15 12 - 45 U/L    ALT(SGPT) 13 2 - 50 U/L    Alkaline Phosphatase 65 30 - 99 U/L    Total Bilirubin 0.3 0.1 - 1.5 mg/dL    Albumin 4.4 3.2 - 4.9 g/dL    Total Protein 7.0 6.0 - 8.2 g/dL    Globulin 2.6 1.9 - 3.5 g/dL    A-G Ratio 1.7 g/dL   LACTIC ACID    Collection Time: 02/24/19  4:40 PM   Result Value Ref Range    Lactic Acid 2.3 (H) 0.5 - 2.0 mmol/L   ESTIMATED GFR    Collection Time: 02/24/19  4:40 PM   Result Value Ref Range    GFR If African American >60 >60 mL/min/1.73 m 2    GFR If Non African American >60 >60 mL/min/1.73 m 2       Imaging/Procedures Review:    Independant Imaging Review: Completed  DX-CHEST-PORTABLE (1 VIEW)   Final Result      No acute cardiac or pulmonary abnormalities are identified.      CT-FOREIGN FILM CAT SCAN   Final Result      OUTSIDE IMAGES-DX CHEST   Final Result                    Assessment/Plan     * Stridor   Assessment & Plan    Patient presented with Cough, shortness of breath, wheeze and stridor following an upper respiratory tract infection.  Received Z-Ruiz and Medrol Dosepak outpatient but still has persistent hoarseness of voice.  Vitals were stable on presentation.  Chest x-ray  negative for any pulmonary abnormality.  On examination, she was noted to have upper airway stridor.  ENT, evaluated patient in the ED and noted a right vocal cord  weakness.    Plan:  -Admit to ICU for upper airway monitoring.  -IV methylprednisolone for 24 hours.  -PO prednisone 40 mg x 7days, 20 mg x 7 days then 10mg x 7days per ENT recommendations.  -Guanfacine cough syrup.  -Aspiration precautions.  -Monitor airway, may need intubation if patient worsens.       Hypokalemia   Assessment & Plan    -Serum potassium of 3.4.  - PO  Potasuim 40 mEq given   - monitor Replete potassium         Anticipated Hospital stay:  >2 midnights        Quality Measures  Quality-Core Measures   Reviewed items::  Labs reviewed and Radiology images reviewed  Stacy catheter::  No Stacy  DVT prophylaxis pharmacological::  Enoxaparin (Lovenox)  Ulcer Prophylaxis::  No    PCP: Adeola Mejia D.O.

## 2019-02-25 NOTE — PROGRESS NOTES
Review of systems:  Neuro: WNL  Cardiac: WNL  Resp: some exp wheezing, o2 sats around 95 for the night. Voice is still hoarse  GI: WNL  : WNL  Skin: WNL    Acute overnight events: n/a    Patient primary concern: n/a    Family primary concern: n/a

## 2019-02-25 NOTE — PROGRESS NOTES
"Critical Care Progress Note    Date of admission  2/24/2019    Chief Complaint  33 y.o. female admitted 2/24/2019 with stridor    Hospital Course   \"I was kindly asked to see and evaluate Chantale Donato, a 33 y.o. female for evaluation and management of the above problem.     This is a previously healthy lady.  She presented to an outside hospital earlier today with a complaint of shortness of breath and wheezing.  She had stridor.  She was treated with steroids and nebulized epinephrine.  She continued to have significant dyspnea and was transferred to [facility] for subspecialty care.  Dr. Laura Ireland from ENT will be seeing her shortly.  This lady tells me that her symptoms began approximately 1 month ago.  At that time she had flulike symptoms and presented for evaluation.  She was diagnosed with influenza.  This diagnosis was made with a flu swab.  She was treated with Tamiflu and a Z-Ruiz.  Her fever, myalgias and arthralgias have subsequently resolved.  She developed hoarseness and problems with breathing with wheezing.  About a week after she was seen for influenza, she presented to her PCP and was given steroids and a second Z-Ruiz.  Despite these treatments, she does not feel like she has been improving.  She continues to have a feeling that her throat is constricted with associated hoarseness, wheezing and dyspnea.  She denies any fevers, chills or sweats.  She is not producing sputum.\"      Interval Problem Update  Reviewed last 24 hour events:   - No acute events overnight, voice slightly improved   - Neuro: AOx4   - HR: SR   - SBP: 110s   - GI: tolerating diet   - UOP: adequate   - Stacy: no   - Tm: afeb   - Lines: PIV   - PPx: GI pepcid, DVT lovenox   - RA    Review of Systems  Review of Systems   Constitutional: Negative for chills and fever.   HENT: Positive for congestion and sore throat. Negative for ear discharge, ear pain, hearing loss, nosebleeds, sinus pain and tinnitus.    Eyes: Negative " for discharge and redness.   Respiratory: Positive for cough, shortness of breath, wheezing and stridor.    Cardiovascular: Negative for chest pain, palpitations and leg swelling.   Gastrointestinal: Negative for abdominal pain, diarrhea, heartburn, nausea and vomiting.   Genitourinary: Negative.    Musculoskeletal: Positive for myalgias.   Skin: Negative for rash.   Neurological: Positive for headaches.   Psychiatric/Behavioral: Negative.         Vital Signs for last 24 hours   Temp:  [36.1 °C (97 °F)-36.9 °C (98.4 °F)] 36.7 °C (98.1 °F)  Pulse:  [] 78  Resp:  [14-28] 22  BP: (112-138)/(79-92) 138/92  SpO2:  [94 %-100 %] 95 %    Hemodynamic parameters for last 24 hours       Respiratory Information for the last 24 hours       Physical Exam   Physical Exam   Constitutional: She is oriented to person, place, and time. She appears well-developed and well-nourished.   HENT:   Head: Normocephalic and atraumatic.   Right Ear: External ear normal.   Left Ear: External ear normal.   Nose: Nose normal.   Eyes: Pupils are equal, round, and reactive to light. Conjunctivae are normal.   Neck: Neck supple. No JVD present. No tracheal deviation present.   Voice horse and weak, no obvious stridor when distracted   Cardiovascular: Normal rate, regular rhythm and intact distal pulses.    Pulmonary/Chest: Breath sounds normal. No accessory muscle usage. No respiratory distress.   Abdominal: Soft. Bowel sounds are normal. She exhibits no distension. There is no tenderness.   Musculoskeletal: Normal range of motion. She exhibits no tenderness or deformity.   Neurological: She is alert and oriented to person, place, and time. She exhibits normal muscle tone. Coordination normal.   Skin: Skin is warm and dry. No rash noted.   Psychiatric: She has a normal mood and affect. Her behavior is normal.   Nursing note and vitals reviewed.      Medications  Current Facility-Administered Medications   Medication Dose Route Frequency  Provider Last Rate Last Dose   • senna-docusate (PERICOLACE or SENOKOT S) 8.6-50 MG per tablet 2 Tab  2 Tab Oral BID Sonia Shelley M.D.        And   • polyethylene glycol/lytes (MIRALAX) PACKET 1 Packet  1 Packet Oral QDAY PRN Sonia Shelley M.D.        And   • magnesium hydroxide (MILK OF MAGNESIA) suspension 30 mL  30 mL Oral QDAY PRN Sonia Shelley M.D.        And   • bisacodyl (DULCOLAX) suppository 10 mg  10 mg Rectal QDAY PRN Sonia Shelley M.D.       • Respiratory Care per Protocol   Nebulization Continuous RT Sonia Shelley M.D.       • enoxaparin (LOVENOX) inj 40 mg  40 mg Subcutaneous DAILY Sonia Shelley M.D.   40 mg at 02/25/19 0532   • enalaprilat (VASOTEC) injection 1.25 mg  1.25 mg Intravenous Q6HRS PRN Sonia Shelley M.D.       • guaiFENesin dextromethorphan (ROBITUSSIN DM) 100-10 MG/5ML syrup 10 mL  10 mL Oral Q6HRS PRN Sonia Shelley M.D.   10 mL at 02/25/19 0532   • acetaminophen (TYLENOL) tablet 650 mg  650 mg Oral Q6HRS PRN Sonia Shelley M.D.       • predniSONE (DELTASONE) tablet 40 mg  40 mg Oral DAILY Snoia Shelley M.D.        Followed by   • [START ON 3/4/2019] predniSONE (DELTASONE) tablet 20 mg  20 mg Oral DAILY Sonia Shelley M.D.        Followed by   • [START ON 3/11/2019] predniSONE (DELTASONE) tablet 10 mg  10 mg Oral DAILY Sonia Shelley M.D.           Fluids    Intake/Output Summary (Last 24 hours) at 02/25/19 0713  Last data filed at 02/25/19 0600   Gross per 24 hour   Intake              200 ml   Output             1450 ml   Net            -1250 ml       Laboratory          Recent Labs      02/24/19   1640  02/25/19   0549   SODIUM  134*  136   POTASSIUM  3.4*  3.9   CHLORIDE  107  110   CO2  21  21   BUN  15  17   CREATININE  0.75  0.56   CALCIUM  9.5  9.5     Recent Labs      02/24/19   1640  02/25/19   0549   ALTSGPT  13   --    ASTSGOT  15   --    ALKPHOSPHAT  65   --    TBILIRUBIN  0.3   --    GLUCOSE  181*  132*     Recent Labs      02/24/19   1640   02/25/19   0549   WBC  7.8  10.1   NEUTSPOLYS  92.60*  90.40*   LYMPHOCYTES  5.90*  7.10*   MONOCYTES  1.20  1.80   EOSINOPHILS  0.00  0.10   BASOPHILS  0.00  0.10   ASTSGOT  15   --    ALTSGPT  13   --    ALKPHOSPHAT  65   --    TBILIRUBIN  0.3   --      Recent Labs      02/24/19   1640  02/25/19   0549   RBC  4.42  4.33   HEMOGLOBIN  13.3  13.0   HEMATOCRIT  40.0  38.9   PLATELETCT  265  247       Imaging  X-Ray:  I have personally reviewed the images and compared with prior images.    Assessment/Plan  * Stridor   Assessment & Plan    Improves during conversation  Transition to PO prednisone today  Nebulized racemic epinephrine PRN  ENT has scoped - right vocal cord paresis, no severe edema  Continue to monitor inpatient, slowly improving     Hypokalemia   Assessment & Plan    Replete to maintain >4     Unilateral vocal cord paralysis- (present on admission)   Assessment & Plan    Right, post-URI  High dose steroids  ENT F/U          VTE:  Lovenox  Ulcer: H2 Antagonist  Lines: None    I have performed a physical exam and reviewed and updated ROS and Plan today (2/25/2019). In review of yesterday's note (2/24/2019), there are no changes except as documented above.     Discussed patient condition and risk of morbidity and/or mortality with Family, RN, RT, Pharmacy, , [employer] [facility] resident, Charge nurse / hot rounds and Patient

## 2019-02-25 NOTE — ASSESSMENT & PLAN NOTE
Patient presented with Cough, shortness of breath, wheeze and stridor following an upper respiratory tract infection.  Received Z-Ruiz and Medrol Dosepak outpatient but worsened stridor was noted upon admission.  Vitals were stable on presentation.  Chest x-ray negative for any pulmonary abnormality.  On examination, she was noted to have upper airway stridor.  ENT, evaluated patient in the ED and noted a right vocal cord  weakness post upper respiratory tract infection.  Recommendations include steroid taper, and tight use of medications, and voice restrictions    -IV methylprednisolone for 24 hours received  -PO prednisone 40 mg x 7days, 20 mg x 7 days then 10mg x 7days per ENT recommendations.  -Pepcid started  -Robitussin cough syrup.  -Aspiration precautions.  -Okay to transfer to the medical floor

## 2019-02-25 NOTE — ED NOTES
Med rec updated and complete.  Allergies reviewed.  Pt reports finishing tamiflu and a Z-paty  in  The past two weeks.

## 2019-02-25 NOTE — PROGRESS NOTES
· 2 RN skin check complete.  · Devices in place: pulse ox, BP cuff, cardiac monitor leads, peripheral IV  · Skin assessed under devices: Yes.  · Confirmed pressure ulcers found on: none  · The following interventions in place: patient repositions self and gets up to void regularly. Sensation intact. Heels and elbows floated on pillows.  Ensure patient is not laying on tubing/lines.

## 2019-02-25 NOTE — ED NOTES
Pt transferred from [facility].   Pt reports cold/flu like symptoms for the last 2 wks with being placed on z-pack and tamiflu.  In the last day pt c/o SOB with stridor.  Pt has CT and xray completed PTA.  Pt rec'd albuterol x5, rac epi x2, decadron PTA.  Pt has h/o epiglottitis 5 years ago.

## 2019-02-26 ENCOUNTER — PATIENT OUTREACH (OUTPATIENT)
Dept: HEALTH INFORMATION MANAGEMENT | Facility: OTHER | Age: 34
End: 2019-02-26

## 2019-02-26 VITALS
HEIGHT: 69 IN | OXYGEN SATURATION: 99 % | WEIGHT: 204.37 LBS | RESPIRATION RATE: 35 BRPM | DIASTOLIC BLOOD PRESSURE: 92 MMHG | HEART RATE: 79 BPM | SYSTOLIC BLOOD PRESSURE: 138 MMHG | TEMPERATURE: 98.1 F | BODY MASS INDEX: 30.27 KG/M2

## 2019-02-26 LAB
ANION GAP SERPL CALC-SCNC: 7 MMOL/L (ref 0–11.9)
BASOPHILS # BLD AUTO: 0.2 % (ref 0–1.8)
BASOPHILS # BLD: 0.02 K/UL (ref 0–0.12)
BUN SERPL-MCNC: 24 MG/DL (ref 8–22)
CALCIUM SERPL-MCNC: 9.1 MG/DL (ref 8.5–10.5)
CHLORIDE SERPL-SCNC: 110 MMOL/L (ref 96–112)
CO2 SERPL-SCNC: 23 MMOL/L (ref 20–33)
CREAT SERPL-MCNC: 0.66 MG/DL (ref 0.5–1.4)
EOSINOPHIL # BLD AUTO: 0.01 K/UL (ref 0–0.51)
EOSINOPHIL NFR BLD: 0.1 % (ref 0–6.9)
ERYTHROCYTE [DISTWIDTH] IN BLOOD BY AUTOMATED COUNT: 47.7 FL (ref 35.9–50)
GLUCOSE SERPL-MCNC: 90 MG/DL (ref 65–99)
HCT VFR BLD AUTO: 36.5 % (ref 37–47)
HGB BLD-MCNC: 11.8 G/DL (ref 12–16)
IMM GRANULOCYTES # BLD AUTO: 0.05 K/UL (ref 0–0.11)
IMM GRANULOCYTES NFR BLD AUTO: 0.4 % (ref 0–0.9)
LYMPHOCYTES # BLD AUTO: 2.4 K/UL (ref 1–4.8)
LYMPHOCYTES NFR BLD: 19.8 % (ref 22–41)
MCH RBC QN AUTO: 29.8 PG (ref 27–33)
MCHC RBC AUTO-ENTMCNC: 32.3 G/DL (ref 33.6–35)
MCV RBC AUTO: 92.2 FL (ref 81.4–97.8)
MONOCYTES # BLD AUTO: 0.78 K/UL (ref 0–0.85)
MONOCYTES NFR BLD AUTO: 6.4 % (ref 0–13.4)
NEUTROPHILS # BLD AUTO: 8.85 K/UL (ref 2–7.15)
NEUTROPHILS NFR BLD: 73.1 % (ref 44–72)
NRBC # BLD AUTO: 0 K/UL
NRBC BLD-RTO: 0 /100 WBC
PLATELET # BLD AUTO: 256 K/UL (ref 164–446)
PMV BLD AUTO: 10.7 FL (ref 9–12.9)
POTASSIUM SERPL-SCNC: 3.6 MMOL/L (ref 3.6–5.5)
RBC # BLD AUTO: 3.96 M/UL (ref 4.2–5.4)
SODIUM SERPL-SCNC: 140 MMOL/L (ref 135–145)
WBC # BLD AUTO: 12.1 K/UL (ref 4.8–10.8)

## 2019-02-26 PROCEDURE — A9270 NON-COVERED ITEM OR SERVICE: HCPCS | Performed by: INTERNAL MEDICINE

## 2019-02-26 PROCEDURE — 85025 COMPLETE CBC W/AUTO DIFF WBC: CPT

## 2019-02-26 PROCEDURE — 700111 HCHG RX REV CODE 636 W/ 250 OVERRIDE (IP): Performed by: INTERNAL MEDICINE

## 2019-02-26 PROCEDURE — 700102 HCHG RX REV CODE 250 W/ 637 OVERRIDE(OP): Performed by: INTERNAL MEDICINE

## 2019-02-26 PROCEDURE — 99239 HOSP IP/OBS DSCHRG MGMT >30: CPT | Performed by: INTERNAL MEDICINE

## 2019-02-26 PROCEDURE — 80048 BASIC METABOLIC PNL TOTAL CA: CPT

## 2019-02-26 RX ORDER — GUAIFENESIN/DEXTROMETHORPHAN 100-10MG/5
10 SYRUP ORAL EVERY 6 HOURS PRN
Qty: 840 ML | Refills: 1 | Status: SHIPPED | OUTPATIENT
Start: 2019-02-26

## 2019-02-26 RX ORDER — FAMOTIDINE 40 MG/1
20 TABLET, FILM COATED ORAL DAILY
Qty: 30 TAB | Refills: 2 | Status: SHIPPED | OUTPATIENT
Start: 2019-02-26

## 2019-02-26 RX ORDER — PREDNISONE 10 MG/1
TABLET ORAL
Qty: 41 TAB | Refills: 0 | Status: SHIPPED | OUTPATIENT
Start: 2019-02-26

## 2019-02-26 RX ADMIN — SENNOSIDES AND DOCUSATE SODIUM 2 TABLET: 8.6; 5 TABLET ORAL at 05:40

## 2019-02-26 RX ADMIN — PREDNISONE 40 MG: 20 TABLET ORAL at 05:40

## 2019-02-26 RX ADMIN — GUAIFENESIN AND DEXTROMETHORPHAN 10 ML: 100; 10 SYRUP ORAL at 00:09

## 2019-02-26 RX ADMIN — FAMOTIDINE 20 MG: 20 TABLET ORAL at 05:40

## 2019-02-26 RX ADMIN — ENOXAPARIN SODIUM 40 MG: 100 INJECTION SUBCUTANEOUS at 05:40

## 2019-02-26 ASSESSMENT — ENCOUNTER SYMPTOMS
FEVER: 0
CHILLS: 0
SHORTNESS OF BREATH: 1
ABDOMINAL PAIN: 0
SORE THROAT: 1
VOMITING: 0
COUGH: 1
NAUSEA: 0

## 2019-02-26 NOTE — DISCHARGE INSTRUCTIONS
Discharge Instructions    Discharged to home by car with Spouse . Discharged via walking, hospital escort: Refused.  Special equipment needed: Not Applicable    Be sure to schedule a follow-up appointment with your primary care doctor or any specialists as instructed.     Discharge Plan:   Diet Plan: Discussed  Activity Level: Discussed  Confirmed Follow up Appointment: Patient to Call and Schedule Appointment  Confirmed Symptoms Management: Discussed  Medication Reconciliation Updated: Yes  Influenza Vaccine Indication: Not indicated: Previously immunized this influenza season and > 8 years of age    I understand that a diet low in cholesterol, fat, and sodium is recommended for good health. Unless I have been given specific instructions below for another diet, I accept this instruction as my diet prescription.   Other diet: Regular     Special Instructions: None        Disposition:   Home     Diet:   Regular     Activity:   As tolerated    Instructions:      Follow up with ENT physician   The patient was instructed to return to the ER in the event of worsening symptoms. I have counseled the patient on the importance of compliance and the patient has agreed to proceed with all medical recommendations and follow up plan indicated above.   The patient understands that all medications come with benefits and risks. Risks may include permanent injury or death and these risks can be minimized with close reassessment and monitoring.         Primary Care Provider:    Adeola Mejia D.O.     Copy of discharge summary given to the patient:Yes        Follow up appointment details :      Pending ENT  Che Ireland  [id number] [place] [id number]  [phone]      Was the patient discharged on coumadin: No  [phone] / Suicide Risk    As you are discharged from this [facility] facility, it is important to learn how to keep safe from harming yourself.    Recognize the warning signs:  · Abrupt changes in  personality, positive or negative- including increase in energy   · Giving away possessions  · Change in eating patterns- significant weight changes-  positive or negative  · Change in sleeping patterns- unable to sleep or sleeping all the time   · Unwillingness or inability to communicate  · Depression  · Unusual sadness, discouragement and loneliness  · Talk of wanting to die  · Neglect of personal appearance   · Rebelliousness- reckless behavior  · Withdrawal from people/activities they love  · Confusion- inability to concentrate     If you or a loved one observes any of these behaviors or has concerns about self-harm, here's what you can do:  · Talk about it- your feelings and reasons for harming yourself  · Remove any means that you might use to hurt yourself (examples: pills, rope, extension cords, firearm)  · Get professional help from the community (Mental Health, Substance Abuse, psychological counseling)  · Do not be alone:Call your Safe Contact- someone whom you trust who will be there for you.  · Call your local CRISIS HOTLINE 046-3151 or 714-476-3609  · Call your local Children's Mobile Crisis Response Team [place] (993) 618-8654 or www.[employer]  · Call the toll free National Suicide Prevention Hotlines   · National Suicide Prevention Lifeline 320-154-VWMV (9494)  · National Hope Line Network 800-SUICIDE (954-8007)

## 2019-02-26 NOTE — PROGRESS NOTES
"Critical Care Progress Note    Date of admission  2/24/2019    Chief Complaint  33 y.o. female admitted 2/24/2019 with stridor    Hospital Course   \"I was kindly asked to see and evaluate Chantale Donato, a 33 y.o. female for evaluation and management of the above problem.     This is a previously healthy lady.  She presented to an outside hospital earlier today with a complaint of shortness of breath and wheezing.  She had stridor.  She was treated with steroids and nebulized epinephrine.  She continued to have significant dyspnea and was transferred to [facility] for subspecialty care.  Dr. Laura Ireland from ENT will be seeing her shortly.  This lady tells me that her symptoms began approximately 1 month ago.  At that time she had flulike symptoms and presented for evaluation.  She was diagnosed with influenza.  This diagnosis was made with a flu swab.  She was treated with Tamiflu and a Z-Ruiz.  Her fever, myalgias and arthralgias have subsequently resolved.  She developed hoarseness and problems with breathing with wheezing.  About a week after she was seen for influenza, she presented to her PCP and was given steroids and a second Z-Ruiz.  Despite these treatments, she does not feel like she has been improving.  She continues to have a feeling that her throat is constricted with associated hoarseness, wheezing and dyspnea.  She denies any fevers, chills or sweats.  She is not producing sputum.\"      Interval Problem Update  Reviewed last 24 hour events:   - No acute events, voice markedly improved   - Neuro: AOx4   - HR: 50s-80s   - SBP: 100s-130s   - GI: tolerating diet   - UOP: adequate   - Stacy: no   - Tm: afeb   - Lines: PIV   - PPx: GI pepcid, DVT lovenox   - RA   - Symptoms improved, patient eager for discharge   - Follow-up will be obtained with ENT, high-dose steroids to be continued post discharge with taper.   - Breast-feeding reviewed with patient, per patient she will \"pump and " "dump\".    Yesterday   - No acute events overnight, voice slightly improved   - Neuro: AOx4   - HR: SR   - SBP: 110s   - GI: tolerating diet   - UOP: adequate   - Stacy: no   - Tm: afeb   - Lines: PIV   - PPx: GI pepcid, DVT lovenox   - RA    Review of Systems  Review of Systems   Constitutional: Positive for malaise/fatigue. Negative for chills and fever.   HENT: Positive for congestion and sore throat.    Respiratory: Positive for cough and shortness of breath. Negative for sputum production, wheezing and stridor.    Cardiovascular: Negative for chest pain.   Gastrointestinal: Negative for abdominal pain, nausea and vomiting.   Genitourinary: Negative for dysuria.   Musculoskeletal: Negative for myalgias.   Skin: Negative for rash.   Neurological: Negative for dizziness, sensory change, focal weakness and weakness.        Vital Signs for last 24 hours   Temp:  [36.5 °C (97.7 °F)-36.8 °C (98.3 °F)] 36.6 °C (97.9 °F)  Pulse:  [79-85] 79  Resp:  [21-35] 35  SpO2:  [96 %-98 %] 98 %    Hemodynamic parameters for last 24 hours       Respiratory Information for the last 24 hours       Physical Exam   Physical Exam   Constitutional: She is oriented to person, place, and time. She appears well-developed and well-nourished.   HENT:   Head: Normocephalic and atraumatic.   Right Ear: External ear normal.   Left Ear: External ear normal.   Nose: Nose normal.   Eyes: Pupils are equal, round, and reactive to light. Conjunctivae are normal.   Neck: Neck supple. No JVD present. No tracheal deviation present.   Voice horse and less weak, no obvious stridor when distracted   Cardiovascular: Normal rate, regular rhythm and intact distal pulses.    Pulmonary/Chest: Breath sounds normal. No accessory muscle usage. No respiratory distress.   Abdominal: Soft. Bowel sounds are normal. She exhibits no distension. There is no tenderness.   Musculoskeletal: Normal range of motion. She exhibits no tenderness or deformity.   Neurological: She " is alert and oriented to person, place, and time. She exhibits normal muscle tone. Coordination normal.   Skin: Skin is warm and dry. No rash noted.   Psychiatric: She has a normal mood and affect. Her behavior is normal.   Nursing note and vitals reviewed.      Medications  Current Facility-Administered Medications   Medication Dose Route Frequency Provider Last Rate Last Dose   • famotidine (PEPCID) tablet 20 mg  20 mg Oral BID John Jefferson M.D.   20 mg at 02/26/19 0540   • senna-docusate (PERICOLACE or SENOKOT S) 8.6-50 MG per tablet 2 Tab  2 Tab Oral BID Sonia Shelley M.D.   2 Tab at 02/26/19 0540    And   • polyethylene glycol/lytes (MIRALAX) PACKET 1 Packet  1 Packet Oral QDAY PRN Sonia Shelley M.D.        And   • magnesium hydroxide (MILK OF MAGNESIA) suspension 30 mL  30 mL Oral QDAY PRN Sonia Shelley M.D.        And   • bisacodyl (DULCOLAX) suppository 10 mg  10 mg Rectal QDAY PRN Sonia Shelley M.D.       • Respiratory Care per Protocol   Nebulization Continuous RT Sonia Shelley M.D.       • enoxaparin (LOVENOX) inj 40 mg  40 mg Subcutaneous DAILY Sonia Shelley M.D.   40 mg at 02/26/19 0540   • enalaprilat (VASOTEC) injection 1.25 mg  1.25 mg Intravenous Q6HRS PRN Sonia Shelley M.D.       • guaiFENesin dextromethorphan (ROBITUSSIN DM) 100-10 MG/5ML syrup 10 mL  10 mL Oral Q6HRS PRN Sonia Shelley M.D.   10 mL at 02/26/19 0009   • acetaminophen (TYLENOL) tablet 650 mg  650 mg Oral Q6HRS PRN Sonia Shelley M.D.       • predniSONE (DELTASONE) tablet 40 mg  40 mg Oral DAILY Sonia Shelley M.D.   40 mg at 02/26/19 0540    Followed by   • [START ON 3/4/2019] predniSONE (DELTASONE) tablet 20 mg  20 mg Oral DAILY Sonia Shelley M.D.        Followed by   • [START ON 3/11/2019] predniSONE (DELTASONE) tablet 10 mg  10 mg Oral DAILY Sonia Shelley M.D.           Fluids    Intake/Output Summary (Last 24 hours) at 02/26/19 0750  Last data filed at 02/25/19 2000   Gross per 24 hour   Intake               740 ml   Output              850 ml   Net             -110 ml       Laboratory          Recent Labs      02/24/19   1640  02/25/19   0549  02/26/19   0550   SODIUM  134*  136  140   POTASSIUM  3.4*  3.9  3.6   CHLORIDE  107  110  110   CO2  21  21  23   BUN  15  17  24*   CREATININE  0.75  0.56  0.66   CALCIUM  9.5  9.5  9.1     Recent Labs      02/24/19   1640  02/25/19   0549  02/26/19   0550   ALTSGPT  13   --    --    ASTSGOT  15   --    --    ALKPHOSPHAT  65   --    --    TBILIRUBIN  0.3   --    --    GLUCOSE  181*  132*  90     Recent Labs      02/24/19   1640  02/25/19   0549  02/26/19   0550   WBC  7.8  10.1  12.1*   NEUTSPOLYS  92.60*  90.40*  73.10*   LYMPHOCYTES  5.90*  7.10*  19.80*   MONOCYTES  1.20  1.80  6.40   EOSINOPHILS  0.00  0.10  0.10   BASOPHILS  0.00  0.10  0.20   ASTSGOT  15   --    --    ALTSGPT  13   --    --    ALKPHOSPHAT  65   --    --    TBILIRUBIN  0.3   --    --      Recent Labs      02/24/19   1640  02/25/19   0549  02/26/19   0550   RBC  4.42  4.33  3.96*   HEMOGLOBIN  13.3  13.0  11.8*   HEMATOCRIT  40.0  38.9  36.5*   PLATELETCT  265  247  256       Imaging  X-Ray:  I have personally reviewed the images and compared with prior images.    Assessment/Plan  * Stridor   Assessment & Plan    Improved  Discharge with PO prednisone taper  ENT has scoped - right vocal cord paresis, no severe edema  Follow-up with ENT  Return to hospital immediately for any worsening shortness of breath     Hypokalemia   Assessment & Plan    Replete to maintain >4     Unilateral vocal cord paralysis- (present on admission)   Assessment & Plan    Right, post-URI  High dose steroid taper  ENT F/U  Pepcid for GI prophylaxis while on steroids          VTE:  Lovenox  Ulcer: H2 Antagonist  Lines: None    I have performed a physical exam and reviewed and updated ROS and Plan today (2/26/2019). In review of yesterday's note (2/25/2019), there are no changes except as documented above.     Discussed  patient condition and risk of morbidity and/or mortality with RN, RT, Pharmacy, , [employer] [facility] resident, Charge nurse / hot rounds and Patient

## 2019-02-26 NOTE — DISCHARGE SUMMARY
Internal Medicine Discharge Summary  Note Author: John Jefferson M.D.       Name Chantale Donato 1985   Age/Sex 33 y.o. female   MRN [id number]         Admit Date:  2019       Discharge Date:   2019    Service:   [facility] Internal Medicine [facility] Team  Attending Physician(s):   Dr. De Los Santos       Senior Resident(s):   Dr. Jefferson    PCP: Adeola Mejia D.O.      Primary Diagnosis:   Stridor secondary to right vocal cord weakness post Influenza upper respiratory tract infection (acute laryngitis)    Secondary Diagnoses:                Principal Problem:    Stridor POA: Unknown  Active Problems:    Hypokalemia POA: Unknown    Unilateral vocal cord paralysis POA: Yes  Resolved Problems:    * No resolved hospital problems. *      Hospital Summary (Brief Narrative):          33-year-old female without a significant past medical history presented to the emergency department on the afternoon of 2019 with the complaints of difficulty breathing, noisy breathing, and cough.  She was recently diagnosed with influenza and placed on a Z-Ruiz x2, Tamiflu, and a Medrol Dosepak on an outpatient basis for cough, minimal phlegm production, and subjective fevers with noted influenza test positive in the office setting.  After returning home, her condition continued to worsen and noted changes in her voice including hoarseness with difficulty breathing and noisy breathing.  Upon initial ERP evaluation in the emergency department, ENT consultation was performed along with racemic epinephrine nebulization.  Slight right-sided weakness of the true vocal cords was diagnosed upon laryngoscopy.  Patient was advised IV steroids for 24 hours along with a 3-week prednisone taper, vocal rest, and anti-tussive medications.  She was admitted to [facility] ICU for monitoring.  ICU course has been largely benign. Patient was discharged in a stable condition to follow up with ENT physician.     Patient /Hospital Summary  (Details -- Problem Oriented) :          * Stridor   Assessment & Plan    Patient presented with Cough, shortness of breath, wheeze and stridor following an upper respiratory tract infection.  Received Z-Ruiz and Medrol Dosepak outpatient but worsened stridor was noted upon admission.  Vitals were stable on presentation.  Chest x-ray negative for any pulmonary abnormality.  On examination, she was noted to have upper airway stridor.  ENT, evaluated patient in the ED and noted a right vocal cord  weakness post upper respiratory tract infection.  Recommendations include steroid taper, and tight use of medications, and voice restrictions    -IV methylprednisolone for 24 hours received  -PO prednisone 40 mg x 7days, 20 mg x 7 days then 10mg x 7days per ENT recommendations.  -Pepcid started  -Robitussin cough syrup.  -Aspiration precautions.  -Okay to transfer to the medical floor       Hypokalemia   Assessment & Plan    - monitor Replete potassium         Consultants:     ENT  PMA    Procedures:        Laryngoscopy upon admission    Imaging/ Testing:      DX-CHEST-PORTABLE (1 VIEW)   Final Result      No acute cardiac or pulmonary abnormalities are identified.      CT-FOREIGN FILM CAT SCAN   Final Result      OUTSIDE IMAGES-DX CHEST   Final Result            Discharge Medications:         Medication Reconciliation: Completed       Medication List      START taking these medications      Instructions   famotidine 40 MG Tabs  Commonly known as:  PEPCID   Take 0.5 Tabs by mouth every day.  Dose:  20 mg     guaiFENesin dextromethorphan 100-10 MG/5ML Syrp syrup  Commonly known as:  ROBITUSSIN DM   Take 10 mL by mouth every 6 hours as needed for Cough.  Dose:  10 mL     predniSONE 10 MG Tabs  Commonly known as:  DELTASONE   40 mg x 5 days then 20 mg x 7 days then 10 mg x 7 days        CONTINUE taking these medications      Instructions   benzonatate 100 MG Caps  Commonly known as:  TESSALON   Take 100 mg by mouth 3 times a day  as needed for Cough.  Dose:  100 mg     REGLAN 10 MG Tabs  Generic drug:  metoclopramide   Take 10 mg by mouth 4 times a day.  Dose:  10 mg                Disposition:   Home    Diet:   Regular    Activity:   As tolerated    Instructions:      Follow up with ENT physician   The patient was instructed to return to the ER in the event of worsening symptoms. I have counseled the patient on the importance of compliance and the patient has agreed to proceed with all medical recommendations and follow up plan indicated above.   The patient understands that all medications come with benefits and risks. Risks may include permanent injury or death and these risks can be minimized with close reassessment and monitoring.        Primary Care Provider:    Adeola Mejia D.O.    Discharge summary faxed to primary care provider:  Deferred  Copy of discharge summary given to the patient: Deferred      Follow up appointment details :      Pending ENT    Pending Studies:        N/A    Time spent on discharge day patient visit, preparing discharge paperwork and arranging for patient follow up.    Summary of follow up issues:   N/A    Discharge Time (Minutes) :    >45 mins  Hospital Course Type:  Inpatient Stay >2 midnights      Condition on Discharge    ______________________________________________________________________    Interval history/exam for day of discharge:     Denies any cough, phlegm production, nasal congestion, worsening voice changes. Reports mild improvement compared to yesterday.       Most Recent Labs:    Lab Results   Component Value Date/Time    WBC 12.1 (H) 02/26/2019 05:50 AM    RBC 3.96 (L) 02/26/2019 05:50 AM    HEMOGLOBIN 11.8 (L) 02/26/2019 05:50 AM    HEMATOCRIT 36.5 (L) 02/26/2019 05:50 AM    MCV 92.2 02/26/2019 05:50 AM    MCH 29.8 02/26/2019 05:50 AM    MCHC 32.3 (L) 02/26/2019 05:50 AM    MPV 10.7 02/26/2019 05:50 AM    NEUTSPOLYS 73.10 (H) 02/26/2019 05:50 AM    LYMPHOCYTES 19.80 (L) 02/26/2019  05:50 AM    MONOCYTES 6.40 02/26/2019 05:50 AM    EOSINOPHILS 0.10 02/26/2019 05:50 AM    BASOPHILS 0.20 02/26/2019 05:50 AM      Lab Results   Component Value Date/Time    SODIUM 140 02/26/2019 05:50 AM    POTASSIUM 3.6 02/26/2019 05:50 AM    CHLORIDE 110 02/26/2019 05:50 AM    CO2 23 02/26/2019 05:50 AM    GLUCOSE 90 02/26/2019 05:50 AM    BUN 24 (H) 02/26/2019 05:50 AM    CREATININE 0.66 02/26/2019 05:50 AM      Lab Results   Component Value Date/Time    ALTSGPT 13 02/24/2019 04:40 PM    ASTSGOT 15 02/24/2019 04:40 PM    ALKPHOSPHAT 65 02/24/2019 04:40 PM    TBILIRUBIN 0.3 02/24/2019 04:40 PM    ALBUMIN 4.4 02/24/2019 04:40 PM    GLOBULIN 2.6 02/24/2019 04:40 PM     No results found for: PROTHROMBTM, INR

## 2019-02-27 ASSESSMENT — ENCOUNTER SYMPTOMS
DIZZINESS: 0
SPUTUM PRODUCTION: 0
MYALGIAS: 0
STRIDOR: 0
FOCAL WEAKNESS: 0
WEAKNESS: 0
SENSORY CHANGE: 0
WHEEZING: 0

## 2021-04-15 NOTE — ASSESSMENT & PLAN NOTE
Improved  Discharge with PO prednisone taper  ENT has scoped - right vocal cord paresis, no severe edema  Follow-up with ENT  Return to hospital immediately for any worsening shortness of breath   Retina ATTACHED and doing well today.

## 2022-05-02 NOTE — CARE PLAN
Message left for patient to return call to office.     Problem: Safety  Goal: Will remain free from injury  Bed in the lowest locked position with bed alarm on. Patient uses call light appropriately.

## 2022-05-03 ENCOUNTER — HOSPITAL ENCOUNTER (EMERGENCY)
Facility: MEDICAL CENTER | Age: 37
End: 2022-05-04
Attending: EMERGENCY MEDICINE
Payer: COMMERCIAL

## 2022-05-03 ENCOUNTER — APPOINTMENT (OUTPATIENT)
Dept: RADIOLOGY | Facility: MEDICAL CENTER | Age: 37
End: 2022-05-03
Attending: EMERGENCY MEDICINE
Payer: COMMERCIAL

## 2022-05-03 DIAGNOSIS — B34.9 VIRAL SYNDROME: ICD-10-CM

## 2022-05-03 LAB
ALBUMIN SERPL BCP-MCNC: 4.7 G/DL (ref 3.2–4.9)
ALBUMIN/GLOB SERPL: 1.7 G/DL
ALP SERPL-CCNC: 63 U/L (ref 30–99)
ALT SERPL-CCNC: 19 U/L (ref 2–50)
ANION GAP SERPL CALC-SCNC: 13 MMOL/L (ref 7–16)
AST SERPL-CCNC: 18 U/L (ref 12–45)
BASOPHILS # BLD AUTO: 0.2 % (ref 0–1.8)
BASOPHILS # BLD: 0.03 K/UL (ref 0–0.12)
BILIRUB SERPL-MCNC: 0.3 MG/DL (ref 0.1–1.5)
BUN SERPL-MCNC: 18 MG/DL (ref 8–22)
CALCIUM SERPL-MCNC: 9.8 MG/DL (ref 8.5–10.5)
CHLORIDE SERPL-SCNC: 104 MMOL/L (ref 96–112)
CO2 SERPL-SCNC: 23 MMOL/L (ref 20–33)
CREAT SERPL-MCNC: 0.72 MG/DL (ref 0.5–1.4)
EKG IMPRESSION: NORMAL
EOSINOPHIL # BLD AUTO: 0.1 K/UL (ref 0–0.51)
EOSINOPHIL NFR BLD: 0.8 % (ref 0–6.9)
ERYTHROCYTE [DISTWIDTH] IN BLOOD BY AUTOMATED COUNT: 44.4 FL (ref 35.9–50)
GFR SERPLBLD CREATININE-BSD FMLA CKD-EPI: 109 ML/MIN/1.73 M 2
GLOBULIN SER CALC-MCNC: 2.7 G/DL (ref 1.9–3.5)
GLUCOSE SERPL-MCNC: 93 MG/DL (ref 65–99)
HCT VFR BLD AUTO: 42.8 % (ref 37–47)
HGB BLD-MCNC: 14.7 G/DL (ref 12–16)
IMM GRANULOCYTES # BLD AUTO: 0.06 K/UL (ref 0–0.11)
IMM GRANULOCYTES NFR BLD AUTO: 0.5 % (ref 0–0.9)
LYMPHOCYTES # BLD AUTO: 3.66 K/UL (ref 1–4.8)
LYMPHOCYTES NFR BLD: 29.5 % (ref 22–41)
MCH RBC QN AUTO: 30.4 PG (ref 27–33)
MCHC RBC AUTO-ENTMCNC: 34.3 G/DL (ref 33.6–35)
MCV RBC AUTO: 88.6 FL (ref 81.4–97.8)
MONOCYTES # BLD AUTO: 0.86 K/UL (ref 0–0.85)
MONOCYTES NFR BLD AUTO: 6.9 % (ref 0–13.4)
NEUTROPHILS # BLD AUTO: 7.69 K/UL (ref 2–7.15)
NEUTROPHILS NFR BLD: 62.1 % (ref 44–72)
NRBC # BLD AUTO: 0 K/UL
NRBC BLD-RTO: 0 /100 WBC
PLATELET # BLD AUTO: 347 K/UL (ref 164–446)
PMV BLD AUTO: 10.5 FL (ref 9–12.9)
POTASSIUM SERPL-SCNC: 4.4 MMOL/L (ref 3.6–5.5)
PROT SERPL-MCNC: 7.4 G/DL (ref 6–8.2)
RBC # BLD AUTO: 4.83 M/UL (ref 4.2–5.4)
SODIUM SERPL-SCNC: 140 MMOL/L (ref 135–145)
WBC # BLD AUTO: 12.4 K/UL (ref 4.8–10.8)

## 2022-05-03 PROCEDURE — 93005 ELECTROCARDIOGRAM TRACING: CPT | Performed by: EMERGENCY MEDICINE

## 2022-05-03 PROCEDURE — 80053 COMPREHEN METABOLIC PANEL: CPT

## 2022-05-03 PROCEDURE — 36415 COLL VENOUS BLD VENIPUNCTURE: CPT

## 2022-05-03 PROCEDURE — 0241U HCHG SARS-COV-2 COVID-19 NFCT DS RESP RNA 4 TRGT MIC: CPT

## 2022-05-03 PROCEDURE — 85025 COMPLETE CBC W/AUTO DIFF WBC: CPT

## 2022-05-03 PROCEDURE — 93005 ELECTROCARDIOGRAM TRACING: CPT

## 2022-05-03 PROCEDURE — 99284 EMERGENCY DEPT VISIT MOD MDM: CPT

## 2022-05-03 PROCEDURE — C9803 HOPD COVID-19 SPEC COLLECT: HCPCS | Performed by: EMERGENCY MEDICINE

## 2022-05-03 NOTE — Clinical Note
Chantale Donato was seen and treated in our emergency department on 5/3/2022.  She may return to work on 05/07/2022.       If you have any questions or concerns, please don't hesitate to call.      Mark Bro D.O.

## 2022-05-04 ENCOUNTER — APPOINTMENT (OUTPATIENT)
Dept: RADIOLOGY | Facility: MEDICAL CENTER | Age: 37
End: 2022-05-04
Attending: EMERGENCY MEDICINE
Payer: COMMERCIAL

## 2022-05-04 VITALS
TEMPERATURE: 97 F | WEIGHT: 225.97 LBS | HEART RATE: 76 BPM | SYSTOLIC BLOOD PRESSURE: 140 MMHG | RESPIRATION RATE: 18 BRPM | OXYGEN SATURATION: 98 % | BODY MASS INDEX: 33.47 KG/M2 | HEIGHT: 69 IN | DIASTOLIC BLOOD PRESSURE: 81 MMHG

## 2022-05-04 LAB
FLUAV RNA SPEC QL NAA+PROBE: NEGATIVE
FLUBV RNA SPEC QL NAA+PROBE: NEGATIVE
RSV RNA SPEC QL NAA+PROBE: NEGATIVE
SARS-COV-2 RNA RESP QL NAA+PROBE: NOTDETECTED
SPECIMEN SOURCE: NORMAL

## 2022-05-04 PROCEDURE — 700102 HCHG RX REV CODE 250 W/ 637 OVERRIDE(OP): Performed by: EMERGENCY MEDICINE

## 2022-05-04 PROCEDURE — 700111 HCHG RX REV CODE 636 W/ 250 OVERRIDE (IP): Performed by: EMERGENCY MEDICINE

## 2022-05-04 PROCEDURE — 71045 X-RAY EXAM CHEST 1 VIEW: CPT

## 2022-05-04 PROCEDURE — 94640 AIRWAY INHALATION TREATMENT: CPT

## 2022-05-04 PROCEDURE — A9270 NON-COVERED ITEM OR SERVICE: HCPCS | Performed by: EMERGENCY MEDICINE

## 2022-05-04 PROCEDURE — 70360 X-RAY EXAM OF NECK: CPT

## 2022-05-04 PROCEDURE — 96374 THER/PROPH/DIAG INJ IV PUSH: CPT

## 2022-05-04 RX ORDER — PREDNISONE 20 MG/1
60 TABLET ORAL DAILY
Qty: 15 TABLET | Refills: 0 | Status: SHIPPED | OUTPATIENT
Start: 2022-05-04 | End: 2022-05-09

## 2022-05-04 RX ORDER — METHYLPREDNISOLONE SODIUM SUCCINATE 125 MG/2ML
125 INJECTION, POWDER, LYOPHILIZED, FOR SOLUTION INTRAMUSCULAR; INTRAVENOUS ONCE
Status: COMPLETED | OUTPATIENT
Start: 2022-05-04 | End: 2022-05-04

## 2022-05-04 RX ORDER — BENZONATATE 100 MG/1
100 CAPSULE ORAL 3 TIMES DAILY PRN
Qty: 60 CAPSULE | Refills: 0 | Status: SHIPPED | OUTPATIENT
Start: 2022-05-04 | End: 2022-05-04 | Stop reason: SDUPTHER

## 2022-05-04 RX ORDER — PREDNISONE 20 MG/1
60 TABLET ORAL DAILY
Qty: 15 TABLET | Refills: 0 | Status: SHIPPED | OUTPATIENT
Start: 2022-05-04 | End: 2022-05-04 | Stop reason: SDUPTHER

## 2022-05-04 RX ORDER — ALBUTEROL SULFATE 90 UG/1
2 AEROSOL, METERED RESPIRATORY (INHALATION) EVERY 6 HOURS PRN
Qty: 8.5 G | Refills: 0 | Status: SHIPPED | OUTPATIENT
Start: 2022-05-04 | End: 2022-05-04 | Stop reason: SDUPTHER

## 2022-05-04 RX ORDER — ALBUTEROL SULFATE 90 UG/1
2 AEROSOL, METERED RESPIRATORY (INHALATION) EVERY 6 HOURS PRN
Qty: 8.5 G | Refills: 0 | Status: SHIPPED | OUTPATIENT
Start: 2022-05-04

## 2022-05-04 RX ORDER — BENZONATATE 100 MG/1
100 CAPSULE ORAL 3 TIMES DAILY PRN
Qty: 60 CAPSULE | Refills: 0 | Status: SHIPPED | OUTPATIENT
Start: 2022-05-04

## 2022-05-04 RX ADMIN — METHYLPREDNISOLONE SODIUM SUCCINATE 125 MG: 125 INJECTION, POWDER, FOR SOLUTION INTRAMUSCULAR; INTRAVENOUS at 00:45

## 2022-05-04 RX ADMIN — RACEPINEPHRINE HYDROCHLORIDE 0.5 ML: 11.25 SOLUTION RESPIRATORY (INHALATION) at 01:47

## 2022-05-04 NOTE — ED PROVIDER NOTES
"ED Provider Note    CHIEF COMPLAINT  Chief Complaint   Patient presents with   • Flu Like Symptoms     Paralyzed vocal chords per pt. Pt was diagnosed with influenza B, she's on her second course of prednisone. First diagnosed 4/13. Pt was hospitalized in 2019 with same.    • Shortness of Breath     Pt in tripod position in triage. She says she can't breathe when laying down. Reports it gets worse with exertion.    • Sore Throat     Pt is unable to swallow her pills as of today.        HPI  Chantale Donato is a 37 y.o. female here for evaluation of flulike symptoms.  The patient states that she has had the flu in the past, and she was diagnosed with this a few weeks ago.  States that when she has the flu, it \"paralyzes my vocal cords.\"  She has been taking low-dose steroids intermittently over the last few weeks for the same.  She lives in [place], and is trying to get into see an ENT as well.  Patient states that she she has some fullness in her throat.     ROS;  Please see HPI  O/W negative     PAST MEDICAL HISTORY   has a past medical history of Epiglottitis.    SOCIAL HISTORY  Social History     Tobacco Use   • Smoking status: Never Smoker   • Smokeless tobacco: Never Used   Substance and Sexual Activity   • Alcohol use: Not on file   • Drug use: Not on file   • Sexual activity: Not on file       SURGICAL HISTORY  patient denies any surgical history    CURRENT MEDICATIONS  Home Medications    **Home medications have not yet been reviewed for this encounter**         ALLERGIES  Allergies   Allergen Reactions   • Cinnamon      Blisters on lips     • Remdesivir      Anaphylaxis        REVIEW OF SYSTEMS  See HPI for further details. Review of systems as above, otherwise all other systems are negative.     PHYSICAL EXAM  VITAL SIGNS: /80   Pulse 72   Temp 36.1 °C (97 °F) (Temporal)   Resp 16   Ht 1.753 m (5' 9\")   Wt 102 kg (225 lb 15.5 oz)   SpO2 96%   BMI 33.37 kg/m²     Constitutional: Well " developed, well nourished. No acute distress.  HEENT: Normocephalic, atraumatic.   Neck: Supple, Full range of motion, no stridor  Chest/Pulmonary:  No respiratory distress.  Equal expansion   Musculoskeletal: No deformity, no edema, neurovascular intact.   Neuro: Clear speech, appropriate, cooperative, cranial nerves II-XII grossly intact.  Psych: Anxious mood and affect      Results for orders placed or performed during the hospital encounter of 05/03/22   CBC with Differential   Result Value Ref Range    WBC 12.4 (H) 4.8 - 10.8 K/uL    RBC 4.83 4.20 - 5.40 M/uL    Hemoglobin 14.7 12.0 - 16.0 g/dL    Hematocrit 42.8 37.0 - 47.0 %    MCV 88.6 81.4 - 97.8 fL    MCH 30.4 27.0 - 33.0 pg    MCHC 34.3 33.6 - 35.0 g/dL    RDW 44.4 35.9 - 50.0 fL    Platelet Count 347 164 - 446 K/uL    MPV 10.5 9.0 - 12.9 fL    Neutrophils-Polys 62.10 44.00 - 72.00 %    Lymphocytes 29.50 22.00 - 41.00 %    Monocytes 6.90 0.00 - 13.40 %    Eosinophils 0.80 0.00 - 6.90 %    Basophils 0.20 0.00 - 1.80 %    Immature Granulocytes 0.50 0.00 - 0.90 %    Nucleated RBC 0.00 /100 WBC    Neutrophils (Absolute) 7.69 (H) 2.00 - 7.15 K/uL    Lymphs (Absolute) 3.66 1.00 - 4.80 K/uL    Monos (Absolute) 0.86 (H) 0.00 - 0.85 K/uL    Eos (Absolute) 0.10 0.00 - 0.51 K/uL    Baso (Absolute) 0.03 0.00 - 0.12 K/uL    Immature Granulocytes (abs) 0.06 0.00 - 0.11 K/uL    NRBC (Absolute) 0.00 K/uL   Comp Metabolic Panel   Result Value Ref Range    Sodium 140 135 - 145 mmol/L    Potassium 4.4 3.6 - 5.5 mmol/L    Chloride 104 96 - 112 mmol/L    Co2 23 20 - 33 mmol/L    Anion Gap 13.0 7.0 - 16.0    Glucose 93 65 - 99 mg/dL    Bun 18 8 - 22 mg/dL    Creatinine 0.72 0.50 - 1.40 mg/dL    Calcium 9.8 8.5 - 10.5 mg/dL    AST(SGOT) 18 12 - 45 U/L    ALT(SGPT) 19 2 - 50 U/L    Alkaline Phosphatase 63 30 - 99 U/L    Total Bilirubin 0.3 0.1 - 1.5 mg/dL    Albumin 4.7 3.2 - 4.9 g/dL    Total Protein 7.4 6.0 - 8.2 g/dL    Globulin 2.7 1.9 - 3.5 g/dL    A-G Ratio 1.7 g/dL  "  ESTIMATED GFR   Result Value Ref Range    GFR (CKD-EPI) 109 >60 mL/min/1.73 m 2   EKG   Result Value Ref Range    Report       [facility]    Test Date:  2022  Pt Name:    CHIO OLVERA               Department: ER  MRN:        [id number]                      Room:  Gender:     Female                       Technician: [id number]  :        1985                   Requested By:ER TRIAGE PROTOCOL  Order #:    [id number]                    Reading MD:    Measurements  Intervals                                Axis  Rate:       93                           P:          78  MI:         128                          QRS:        62  QRSD:       78                           T:          49  QT:         344  QTc:        428    Interpretive Statements  SINUS RHYTHM  RIGHT ATRIAL ABNORMALITY  BASELINE WANDER IN LEAD(S) V4  No previous ECG available for comparison       DX-NECK FOR SOFT TISSUE   Final Result      1.  The airway is patent.      DX-CHEST-PORTABLE (1 VIEW)   Final Result      1.  There is no acute cardiopulmonary process.        Ekg;  nsr 93.  No st elevation, no st depression, qtc 428.        PROCEDURES     MEDICAL RECORD  I have reviewed patient's medical record and pertinent results are listed.    COURSE & MEDICAL DECISION MAKING  I have reviewed any medical record information, laboratory studies and radiographic results as noted above.    1:46 AM  The patient is nontoxic-appearing, afebrile comfortable.  She has no issues tolerating secretions, and no wheezing.  At this time she has been given a racemic epi treatment secondary to her \"closed vocal cords\" and a dose of Solu-Medrol IV.  This is requested by the patient.  She has a steroids that she can take at home, and does need an ENT follow-up.  I have provided 1 here for her.  Her x-ray showed no acute airway issues and chest x-ray is clear.  Pt states 'I feel so much better.'    I you have had any blood pressure issues " while here in the emergency department, please see your doctor for a further evaluation or work up.    Differential diagnoses include but not limited to: Anaphylaxis, mass, epiglottitis    This patient presents with viral syndrome.  At this time, I have counseled the patient/family regarding their medications, pain control, and follow up.  They will continue their medications, if any, as prescribed.  They will return immediately for any worsening symptoms and/or any other medical concerns.  They will see their doctor, or contact the doctor provided, in 1-2 days for follow up.       FINAL IMPRESSION  1. Viral syndrome  benzonatate (TESSALON) 100 MG Cap    albuterol 108 (90 Base) MCG/ACT Aero Soln inhalation aerosol    EPINEPHrine 0.3 MG/0.3ML Solution Prefilled Syringe         Electronically signed by: Mark Bro D.O., 5/4/2022 12:26 AM

## 2022-05-04 NOTE — ED TRIAGE NOTES
Chantale Donato  37 y.o. female  Chief Complaint   Patient presents with   • Flu Like Symptoms     Paralyzed vocal chords per pt. Pt was diagnosed with influenza B, she's on her second course of prednisone. First diagnosed 4/13. Pt was hospitalized in 2019 with same.    • Shortness of Breath     Pt in tripod position in triage. She says she can't breathe when laying down. Reports it gets worse with exertion.    • Sore Throat     Pt is unable to swallow her pills as of today.      Pt can talk in full sentences, but has problems breathing out. +wheezing     Vitals:    05/03/22 2229   BP: 123/92   Pulse: 93   Resp: 16   Temp: 36.1 °C (97 °F)   SpO2: 98%       Triage process explained to patient, apologized for wait time, and returned to lobby.  Pt informed to notify staff of any change in condition.